# Patient Record
Sex: FEMALE | Race: WHITE | Employment: FULL TIME | URBAN - METROPOLITAN AREA
[De-identification: names, ages, dates, MRNs, and addresses within clinical notes are randomized per-mention and may not be internally consistent; named-entity substitution may affect disease eponyms.]

---

## 2017-04-04 ENCOUNTER — OFFICE VISIT (OUTPATIENT)
Dept: INTERNAL MEDICINE | Facility: CLINIC | Age: 37
End: 2017-04-04

## 2017-04-04 VITALS
DIASTOLIC BLOOD PRESSURE: 73 MMHG | TEMPERATURE: 98.7 F | RESPIRATION RATE: 22 BRPM | SYSTOLIC BLOOD PRESSURE: 112 MMHG | HEART RATE: 80 BPM | WEIGHT: 131 LBS | OXYGEN SATURATION: 96 %

## 2017-04-04 DIAGNOSIS — J98.01 ACUTE BRONCHOSPASM: Primary | ICD-10-CM

## 2017-04-04 DIAGNOSIS — J06.9 VIRAL URI WITH COUGH: ICD-10-CM

## 2017-04-04 RX ORDER — ALBUTEROL SULFATE 90 UG/1
2 AEROSOL, METERED RESPIRATORY (INHALATION) EVERY 6 HOURS
Qty: 1 INHALER | Refills: 1 | Status: SHIPPED | OUTPATIENT
Start: 2017-04-04 | End: 2017-08-04

## 2017-04-04 ASSESSMENT — PAIN SCALES - GENERAL: PAINLEVEL: NO PAIN (0)

## 2017-04-04 NOTE — PROGRESS NOTES
Consuelo Osborne is a 36 year old female who comes in for    CC: wheeze, cough, fever  HPI:  Ms. Osborne reports horrible body aches/cramps and fever 1 week ago, lasting 2 days. Had chills, did not measure temperature.  Now with frequent dry, unproductive cough, is more tired than ususal. Has been wheezing intermittently. L side tender from coughing. Taking ibuprofen, mucinex and Dayquil, which help somewhat. No significant SOB. Hx smoking ~15 yrs total, now smokes a few cigarettes per day.     No hx asthma. +some seasonal allergies. Didn't get a flu shot this year.     Normally seen at [facility] clinic.     Other issues discussed today:     There is no problem list on file for this patient.      Current Outpatient Prescriptions   Medication Sig Dispense Refill     MedroxyPROGESTERone Acetate (DEPO-PROVERA IM) Every 3 months.       IBUPROFEN PO        albuterol (ALBUTEROL) 108 (90 BASE) MCG/ACT Inhaler Inhale 2 puffs into the lungs every 6 hours 1 Inhaler 1         ALLERGIES: Review of patient's allergies indicates no known allergies.    PAST MEDICAL HX:   Past Medical History:   Diagnosis Date     Ectopic pregnancy 2012       PAST SURGICAL HX:   Past Surgical History:   Procedure Laterality Date     ECTOPIC PREGNANCY SURGERY  2012    fallopian tube removed       IMMUNIZATION HX:   There is no immunization history on file for this patient.    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  to Jed.       ROS:   CONSTITUTIONAL:see HPI  EYES: no acute vision problems or changes  ENT: no ear problems, no mouth problems, no throat problems  RESP:see HPI  CV: no chest pain, no palpitations, no new or worsening peripheral edema    OBJECTIVE:  /73 (BP Location: Right arm, Patient Position: Chair, Cuff Size: Adult Regular)   Pulse 80  Temp 98.7  F (37.1  C) (Oral)  Resp 22  Wt 59.4 kg (131 lb)  SpO2 96%  Breastfeeding? No   Wt Readings from Last 1 Encounters:   04/04/17 59.4 kg (131 lb)     Constitutional: no distress, comfortable, pleasant, well-groomed  Eyes: anicteric, conjunctiva pink, normal extra-ocular movements   Ears, Nose and Throat: tympanic membranes pearly gray with positive light reflex, EACs clear bilaterally, nose clear and free of lesions, tonsils 2+ without erythema or exudates, mucosa pink and moist.   Neck: supple with full range of motion, no thyromegaly, no lymphadenopathy  Cardiovascular: regular rate and rhythm, normal S1 and S2, no murmurs, rubs or gallops  Respiratory: clear to auscultation with good air movement bilaterally, expiratory wheezes throughout, no crackles, non-labored, frequent dry cough    ASSESSMENT/PLAN:    1. Acute bronchospasm    2. Viral URI with cough      Discussed viral infection (possibly influenza, though past the point of effectiveness to treat with Tamiflu), which has triggered acute bronchospasm. Recommended albuterol for wheezing, reviewed instructions for use. Can continue with Tylenol and Ibuprofen, Mucinex for symptom control. Push fluids, rest, and good hand hygiene.    FOLLOW UP: If not improving or if worsening, or as needed for any changes or concerns    OSMEL Martinez CNP

## 2017-04-04 NOTE — MR AVS SNAPSHOT
After Visit Summary   4/4/2017    Consuelo Osborne    MRN: [id number]           Patient Information     Date Of Birth          1980        Visit Information        Provider Department      4/4/2017 5:00 PM Rashmi Cruz APRN [facility]        Today's Diagnoses     Acute bronchospasm    -  1    Viral URI with cough          Care Instructions    Primary Care Center Medication Refill Request Information:  * Please contact your pharmacy regarding ANY request for medication refills.  ** [facility] Prescription Fax = [phone]  * Please allow 3 business days for routine medication refills.  * Please allow 5 business days for controlled substance medication refills.     Primary Care Center Test Result notification information:  *You will be notified with in 7-10 days of your appointment day regarding the results of your test.  If you are on MyChart you will be notified as soon as the provider has reviewed the results and signed off on them.    [facility] [phone]           Follow-ups after your visit        Who to contact     Please call your clinic at [phone] to:    Ask questions about your health    Make or cancel appointments    Discuss your medicines    Learn about your test results    Speak to your doctor   If you have compliments or concerns about an experience at your clinic, or if you wish to file a complaint, please contact [facility] Patient Relations at [phone] or email us at [email or web address]         Additional Information About Your Visit        MyChart Information     [employer] is an electronic gateway that provides easy, online access to your medical records. With [employer], you can request a clinic appointment, read your test results, renew a prescription or communicate with your care team.     To sign up for [employer] visit the website at [email or web address]   You will be asked to enter the access  code listed below, as well as some personal information. Please follow the directions to create your username and password.     Your access code is: [email or web address]  Expires: 7/3/2017  5:40 PM     Your access code will  in 90 days. If you need help or a new code, please contact your [facility] or call [phone] for assistance.        Care EveryWhere ID     This is your Care EveryWhere ID. This could be used by other organizations to access your [facility] medical records  [id number]        Your Vitals Were     Pulse Temperature Respirations Pulse Oximetry Breastfeeding?       80 98.7  F (37.1  C) (Oral) 22 96% No        Blood Pressure from Last 3 Encounters:   17 112/73    Weight from Last 3 Encounters:   17 59.4 kg (131 lb)              Today, you had the following     No orders found for display         Today's Medication Changes          These changes are accurate as of: 17  5:40 PM.  If you have any questions, ask your nurse or doctor.               Start taking these medicines.        Dose/Directions    albuterol 108 (90 BASE) MCG/ACT Inhaler   Commonly known as:  albuterol   Used for:  Acute bronchospasm   Started by:  Rashmi Cruz APRN CNP        Dose:  2 puff   Inhale 2 puffs into the lungs every 6 hours   Quantity:  1 Inhaler   Refills:  1            Where to get your medicines      These medications were sent to [address] [address]    Hours:  TRANSPLANT PHONE NUMBER [phone] Phone:  [phone]     albuterol 108 (90 BASE) MCG/ACT Inhaler                Primary Care Provider    None Specified       No primary provider on file.        Thank you!     Thank you for choosing [facility]  for your care. Our goal is always to provide you with excellent care. Hearing back from our patients is one way we can continue to  improve our services. Please take a few minutes to complete the written survey that you may receive in the mail after your visit with us. Thank you!             Your Updated Medication List - Protect others around you: Learn how to safely use, store and throw away your medicines at www.disposemymeds.org.          This list is accurate as of: 4/4/17  5:40 PM.  Always use your most recent med list.                   Brand Name Dispense Instructions for use    albuterol 108 (90 BASE) MCG/ACT Inhaler    albuterol    1 Inhaler    Inhale 2 puffs into the lungs every 6 hours       DEPO-PROVERA IM      Every 3 months.       IBUPROFEN PO

## 2017-04-04 NOTE — PATIENT INSTRUCTIONS
[facility] Medication Refill Request Information:  * Please contact your pharmacy regarding ANY request for medication refills.  ** [facility] Prescription Fax = [phone]  * Please allow 3 business days for routine medication refills.  * Please allow 5 business days for controlled substance medication refills.     [facility] Test Result notification information:  *You will be notified with in 7-10 days of your appointment day regarding the results of your test.  If you are on MyChart you will be notified as soon as the provider has reviewed the results and signed off on them.    [facility] [phone]

## 2017-04-04 NOTE — NURSING NOTE
Chief Complaint   Patient presents with     Cough     Here for fever and body ache 1 week ago for 2 days; gone but having persistent cough, fatigue, and wheezing.      Bhavin Bagley CMA at 4:59 PM on 4/4/2017

## 2017-07-28 ENCOUNTER — ALLIED HEALTH/NURSE VISIT (OUTPATIENT)
Dept: DERMATOLOGY | Facility: CLINIC | Age: 37
End: 2017-07-28

## 2017-07-28 DIAGNOSIS — Z53.9 ERRONEOUS ENCOUNTER--DISREGARD: Primary | ICD-10-CM

## 2017-07-28 DIAGNOSIS — D48.5 NEOPLASM OF UNCERTAIN BEHAVIOR OF SKIN: Primary | ICD-10-CM

## 2017-07-31 LAB — COPATH REPORT: NORMAL

## 2017-08-04 ENCOUNTER — ALLIED HEALTH/NURSE VISIT (OUTPATIENT)
Dept: DERMATOLOGY | Facility: CLINIC | Age: 37
End: 2017-08-04

## 2017-08-04 ENCOUNTER — OFFICE VISIT (OUTPATIENT)
Dept: DERMATOLOGY | Facility: CLINIC | Age: 37
End: 2017-08-04

## 2017-08-04 DIAGNOSIS — L98.8 RHYTIDES: ICD-10-CM

## 2017-08-04 DIAGNOSIS — R23.8 INTRINSIC AGING OF FACIAL SKIN: Primary | ICD-10-CM

## 2017-08-04 DIAGNOSIS — L98.8 WRINKLES: Primary | ICD-10-CM

## 2017-08-04 ASSESSMENT — PAIN SCALES - GENERAL
PAINLEVEL: NO PAIN (0)
PAINLEVEL: NO PAIN (0)

## 2017-08-04 NOTE — PROGRESS NOTES
CC: Ms. Osborne presents to clinic today with concerns of wrinkles and loss of volume.    Botox Injection Procedure Note    ATTENDING STAFF SURGEON: Dr. Lex Eaton    RESIDENT SURGEON: Sultana Jo    NURSE: Vazquez Barcenas (Liza)    OPERATING ROOM DATA:   SURGERY/PROCEDURE DATE:   SAME     ANESTHESIA:   BLT    PREOPERATIVE DIAGNOSIS:   Crow's feet, Furrowing and Rhytides    LOCATION: forehead, glabella, lateral cheeks    LOT NO: [id number]    EXP DATE: June 2019    OPERATION/PROCEDURE:   Intralesional botulinum toxin injection     Dilution with 0.5 mL preserved sterile normal saline in a 50 Unit Botox Vial.    Total units of botulinum toxin: 50    POSTOPERATIVE DIAGNOSIS:   SAME     PREPARATION:   Chloraprep    DESCRIPTION OF OPERATION/PROCEDURE:   The nature and purpose of the procedure, associated risks(including but not limited to muscle weakness, pain, headache, ptosis, anhidrosis), possible consequences and complications, and alternative methods of treatment were explained in detail. The patient declined a personal or family history of neuromuscular disease prior to the procedure.  An informed operative consent was obtained.    Cosmetic procedure: A total of 50 Units were injected into sites at the glabella, lateral to orbit and forehead. The patient tolerated the procedure well and there were no complications noted. 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    Staff Involved:  Resident(Sultana Jo)/Staff(as above)      Soft Tissue Augmentation Procedure Note: Cosmetic    Procedure Date: 8/4/2017    Diagnosis: Facial rhytides and loss of central facial volume    Product: Juvaderma Ultra XC    Attending Staff: Lex Eaton MD    Resident: Catherine Merida    Assistant: Vazquez Barcenas (Liza)    Locations: cheeks and chin      Description of Operation/Procedure:      The nature and purpose of the procedure, associated risks, possible consequences and complications, and alternative methods of treatment were explained in detail including but not limited to bruising, blindness, stroke, ulceration, ischemia, under correction, over correction, swelling, possible need for multiple treatments, infection, granuloma, pain, dyspigmentation, numbness, weakness or tingling were explained to the patient. Discussion of FDA on-label and off-label use was completed and disclosure for any sites treated off-label versus on-label was provided to patient. The patient verbalized understanding. Photo consent and signed informed consent were obtained.Time-out was performed and patient denied history of severe allergy to bees.  The facial areas were cleansed with hibicens and injections were performed. A total of 1cc of filler was used. The patient tolerated the procedure well and there were no complications. Ice was provided post-procedure. The patient was provided after care instructions and will follow-up as needed.     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    Staff:  Resident/Staff    Sultana Jo MD  PGY-4 Dermatology  Pager: [phone]    ATTENDING ATTESTATION    I,Lex Eaton, have seen, evaluated and discussed the patient with resident physician.  I have reviewed the resident physicians note and agree with their clinical findings, assessment and plan.  Any appropriate changes to the resident's note have been made.    I was present for the entire procedure.      Lex Eaton MD, MS    Department of Dermatology  [facility]: Phone: [phone], Fax:[phone]  [facility] [place]: Phone: [phone], Fax: [phone]

## 2017-08-04 NOTE — PATIENT INSTRUCTIONS
Botulinum Toxin(Botox/Dysport) Cosmetic Information      I will have pain, redness, and swelling. I may have bruising, headache or discomfort at the site(s). Risks are asymmetry, numbness, twitching, brow droop, eyelid droop, headache, double vision, not enough effect or too much effect, difficulty whistling or drinking, loss of muscle tone, headache or infection. A touch-up or multiple treatments may be required.      About Botulinum Toxin (Botox/Dysport)  You have inquired about Botox cosmetic. Botulinum toxin is a purified protein derivative developed from bacteria. It has the ability to immobilize facial muscles that create dynamic wrinkles. Dynamic wrinkles develop due to muscle contraction, and over time become permanent folds in the skin, even when the muscles are not flexed. The use of Botox results in a very pleasing cosmetic effect for many people, leading to a more youthful, relaxed appearance. Botox can be used in combination with injectable fillers, chemical peels, and laser resurfacing to treat deeper wrinkles. It also has become an accepted form of treatment for hyperhidrosis, (or excessive sweating) in people who have not responded to other therapies.     With my treatment side effects may include bruising, headache or discomfort at the site(s). Asymmetry may occur and a touch-up may be required. Risks of this procedure include numbness, muscle twitching, brow or eyelid droop, headache, double vision, not enough effect or too much effect, difficulty whistling or drinking from a straw, loss of muscle tone, headache or infection      Post-Procedure Instructions:  Shower, facial cleansing, use of make-up and medicated creams is not restricted. Do not rub the treated area. Avoid exercise for the 24 hours following the procedure. Some people will experience bruising or eyelid ptosis (drooping) after injection. This is temporary and usually mild. Eyelid ptosis may be treated with special eye drops. Call  your doctor if you have any questions or concerns after your treatment.     Who should I call with questions?    [facility]: [phone]     [facility]: [phone]    For urgent needs outside of business hours call the [facility] at [phone] and ask for the resident on call

## 2017-08-04 NOTE — NURSING NOTE
Dermatology Rooming Note    Consuelo Osborne's goals for this visit include:   Chief Complaint   Patient presents with     Botox     Consuelo comes to clinic today for botox and filler.     Soheila Bob, CMA

## 2017-08-04 NOTE — LETTER
8/4/2017       RE: Consuelo Osborne  [address] [place] [address]     Dear Colleague,    Thank you for referring your patient, Consuelo Osborne, to the [facility] at [facility]. Please see a copy of my visit note below.    CC: Ms. Osborne presents to clinic today with concerns of wrinkles and loss of volume.    Botox Injection Procedure Note    ATTENDING STAFF SURGEON: Dr. Lex Eaton    RESIDENT SURGEON: Sultana Jo    NURSE: Vazquez Glynn)    OPERATING ROOM DATA:   SURGERY/PROCEDURE DATE:   SAME     ANESTHESIA:   BLT    PREOPERATIVE DIAGNOSIS:   Crow's feet, Furrowing and Rhytides    LOCATION: forehead, glabella, lateral cheeks    LOT NO: [id number]    EXP DATE: June 2019    OPERATION/PROCEDURE:   Intralesional botulinum toxin injection     Dilution with 0.5 mL preserved sterile normal saline in a 50 Unit Botox Vial.    Total units of botulinum toxin: 50    POSTOPERATIVE DIAGNOSIS:   SAME     PREPARATION:   Chloraprep    DESCRIPTION OF OPERATION/PROCEDURE:   The nature and purpose of the procedure, associated risks(including but not limited to muscle weakness, pain, headache, ptosis, anhidrosis), possible consequences and complications, and alternative methods of treatment were explained in detail. The patient declined a personal or family history of neuromuscular disease prior to the procedure.  An informed operative consent was obtained.    Cosmetic procedure: A total of 50 Units were injected into sites at the glabella, lateral to orbit and forehead. The patient tolerated the procedure well and there were no complications noted. 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    Staff Involved:  Resident(Sultana Jo)/Staff(as above)      Soft Tissue Augmentation Procedure Note: Cosmetic    Procedure Date:  8/4/2017    Diagnosis: Facial rhytides and loss of central facial volume    Product: Juvaderma Ultra XC    Attending Staff: Lex Eaton MD    Resident: Catherine Merida    Assistant: Vazquez Barcenas (Liza)    Locations: cheeks and chin      Description of Operation/Procedure:     The nature and purpose of the procedure, associated risks, possible consequences and complications, and alternative methods of treatment were explained in detail including but not limited to bruising, blindness, stroke, ulceration, ischemia, under correction, over correction, swelling, possible need for multiple treatments, infection, granuloma, pain, dyspigmentation, numbness, weakness or tingling were explained to the patient. Discussion of FDA on-label and off-label use was completed and disclosure for any sites treated off-label versus on-label was provided to patient. The patient verbalized understanding. Photo consent and signed informed consent were obtained.Time-out was performed and patient denied history of severe allergy to bees.  The facial areas were cleansed with hibicens and injections were performed. A total of 1cc of filler was used. The patient tolerated the procedure well and there were no complications. Ice was provided post-procedure. The patient was provided after care instructions and will follow-up as needed.     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    Staff:  Resident/Staff    Sultana Jo MD  PGY-4 Dermatology  Pager: [phone]    ATTENDING ATTESTATION    I,Lex Eaton, have seen, evaluated and discussed the patient with resident physician.  I have reviewed the resident physicians note and agree with their clinical findings, assessment and plan.  Any appropriate changes to the resident's note have been made.    I was present for the entire procedure.      Lex Eaton MD, MS    Department of Dermatology  [facility]  [facility]: Phone: [phone], Fax:[phone]  [facility]: Phone: [phone], Fax: [phone]              Again, thank you for allowing me to participate in the care of your patient.      Sincerely,    Lex Eaton MD

## 2017-08-04 NOTE — PATIENT INSTRUCTIONS
Botulinum Toxin(Botox/Dysport) Cosmetic Information      I will have pain, redness, and swelling. I may have bruising, headache or discomfort at the site(s). Risks are asymmetry, numbness, twitching, brow droop, eyelid droop, headache, double vision, not enough effect or too much effect, difficulty whistling or drinking, loss of muscle tone, headache or infection. A touch-up or multiple treatments may be required.      About Botulinum Toxin (Botox/Dysport)  You have inquired about Botox cosmetic. Botulinum toxin is a purified protein derivative developed from bacteria. It has the ability to immobilize facial muscles that create dynamic wrinkles. Dynamic wrinkles develop due to muscle contraction, and over time become permanent folds in the skin, even when the muscles are not flexed. The use of Botox results in a very pleasing cosmetic effect for many people, leading to a more youthful, relaxed appearance. Botox can be used in combination with injectable fillers, chemical peels, and laser resurfacing to treat deeper wrinkles. It also has become an accepted form of treatment for hyperhidrosis, (or excessive sweating) in people who have not responded to other therapies.     With my treatment side effects may include bruising, headache or discomfort at the site(s). Asymmetry may occur and a touch-up may be required. Risks of this procedure include numbness, muscle twitching, brow or eyelid droop, headache, double vision, not enough effect or too much effect, difficulty whistling or drinking from a straw, loss of muscle tone, headache or infection      Post-Procedure Instructions:  Shower, facial cleansing, use of make-up and medicated creams is not restricted. Do not rub the treated area. Avoid exercise for the 24 hours following the procedure. Some people will experience bruising or eyelid ptosis (drooping) after injection. This is temporary and usually mild. Eyelid ptosis may be treated with special eye drops. Call  your doctor if you have any questions or concerns after your treatment.     Who should I call with questions?    [facility]: [phone]     [facility]: [phone]    For urgent needs outside of business hours call the [facility] at [phone] and ask for the resident on call      Filler Information:    I will have pain, bruising, redness, and swelling after the procedure and lasting for approximately 1-3 weeks. Risks are lumps/bumps, skin discoloration, bleeding, numbness, infection, granuloma formation, scar, ulceration, under correction, over correction and rarely, risks of stroke and blindness. Multiple treatments may be required.        Dermal fillers are injected into the skin to soften crease or folds, support areas of volume loss or contour specific facial areas.     You may experience a mild to moderate amount of stinging or aching sensation post injection.    To ensure an even correction, the physician will massage the area treated, which may cause a temporary amount of redness to your skin.    Bruising at the site of injection is common and may last two weeks    Temporary minimal to moderate swelling can be expected, which should gradually improve following injection    It is normal to experience some tenderness at the treatment site for a few days    After treatment care instructions:    Apply an ice pack or cold compress to the injection area after treatment to help reduce swelling.    Keep your head elevated(even while sleeping) as much as possible and avoid sleeping on your side or stomach    No alcohol consumption or exercise for the first 24 hours after treatment.    Do not touch the treated area for 6 hours    You may use make-up the following day    You may return to normal/routine activities but you should check with your physician for their recommendation     Avoid excessive scrubbing or rubbing of the injection  area    If you have previously suffered from cold sores, there is a risk that the needle punctures around the mouth/lips could contribute to another recurrence    Immediately report to your physician if you have any of the following:    Delayed swelling happening 7-14 days after treatment    Numbness lasting 3-4 days    Muscle weakness in the area of injection    Severe pain    Dusky discoloration of one half of the face    Changes in vision or eye pain    Cold sore    Who should I call with questions?    [facility]: [phone]     [facility]: [phone]    For urgent needs outside of business hours call the [facility] at [phone] and ask for the resident on call

## 2017-08-04 NOTE — MR AVS SNAPSHOT
After Visit Summary   8/4/2017    Consuelo Osborne    MRN: [id number]           Patient Information     Date Of Birth          1980        Visit Information        Provider Department      8/4/2017 2:00 PM Nurse,  Dermatology [facility] Dermatology        Today's Diagnoses     Wrinkles    -  1      Care Instructions    Botulinum Toxin(Botox/Dysport) Cosmetic Information      I will have pain, redness, and swelling. I may have bruising, headache or discomfort at the site(s). Risks are asymmetry, numbness, twitching, brow droop, eyelid droop, headache, double vision, not enough effect or too much effect, difficulty whistling or drinking, loss of muscle tone, headache or infection. A touch-up or multiple treatments may be required.      About Botulinum Toxin (Botox/Dysport)  You have inquired about Botox cosmetic. Botulinum toxin is a purified protein derivative developed from bacteria. It has the ability to immobilize facial muscles that create dynamic wrinkles. Dynamic wrinkles develop due to muscle contraction, and over time become permanent folds in the skin, even when the muscles are not flexed. The use of Botox results in a very pleasing cosmetic effect for many people, leading to a more youthful, relaxed appearance. Botox can be used in combination with injectable fillers, chemical peels, and laser resurfacing to treat deeper wrinkles. It also has become an accepted form of treatment for hyperhidrosis, (or excessive sweating) in people who have not responded to other therapies.     With my treatment side effects may include bruising, headache or discomfort at the site(s). Asymmetry may occur and a touch-up may be required. Risks of this procedure include numbness, muscle twitching, brow or eyelid droop, headache, double vision, not enough effect or too much effect, difficulty whistling or drinking from a straw, loss of muscle tone, headache or infection      Post-Procedure Instructions:  Shower,  facial cleansing, use of make-up and medicated creams is not restricted. Do not rub the treated area. Avoid exercise for the 24 hours following the procedure. Some people will experience bruising or eyelid ptosis (drooping) after injection. This is temporary and usually mild. Eyelid ptosis may be treated with special eye drops. Call your doctor if you have any questions or concerns after your treatment.     Who should I call with questions?    [facility]: [phone]     [facility]: [phone]    For urgent needs outside of business hours call the [facility] at [phone] and ask for the resident on call              Follow-ups after your visit        Who to contact     Please call your clinic at [phone] to:    Ask questions about your health    Make or cancel appointments    Discuss your medicines    Learn about your test results    Speak to your doctor   If you have compliments or concerns about an experience at your clinic, or if you wish to file a complaint, please contact [facility] Phys[facility] Patient Relations at [phone] or email us at [email or web address]         Additional Information About Your Visit        cisimplehart Information     Codingpeoplet is an electronic gateway that provides easy, online access to your medical records. With BookShout!, you can request a clinic appointment, read your test results, renew a prescription or communicate with your care team.     To sign up for Codingpeoplet visit the website at [email or web address]   You will be asked to enter the access code listed below, as well as some personal information. Please follow the directions to create your username and password.     Your access code is: [email or web address]  Expires: 10/26/2017  2:49 PM     Your access code will  in 90 days. If you need help or a new code, please contact your [facility] Clinic or  call [phone] for assistance.        Care EveryWhere ID     This is your Care EveryWhere ID. This could be used by other organizations to access your [place] medical records  [id number]         Blood Pressure from Last 3 Encounters:   04/04/17 112/73    Weight from Last 3 Encounters:   04/04/17 59.4 kg (131 lb)              Today, you had the following     No orders found for display         Today's Medication Changes          These changes are accurate as of: 8/4/17  3:14 PM.  If you have any questions, ask your nurse or doctor.               Stop taking these medicines if you haven't already. Please contact your care team if you have questions.     albuterol 108 (90 BASE) MCG/ACT Inhaler   Commonly known as:  albuterol   Stopped by:  Nurse,  Dermatology           IBUPROFEN PO   Stopped by:  Nurse,  Dermatology                    Primary Care Provider    None Specified       No primary provider on file.        Equal Access to Services     [facility] Center[facility] : Hadjamison Green, miguelina pearson, sharon ortiz . So [facility] [phone].    ATENCIÓN: Si habla español, tiene a pollard disposición servicios gratuitos de asistencia lingüística. Llame al [phone].    We comply with applicable federal civil rights laws and [place] laws. We do not discriminate on the basis of race, color, national origin, age, disability sex, sexual orientation or gender identity.            Thank you!     Thank you for choosing [facility] DERMATOLOGY  for your care. Our goal is always to provide you with excellent care. Hearing back from our patients is one way we can continue to improve our services. Please take a few minutes to complete the written survey that you may receive in the mail after your visit with us. Thank you!             Your Updated Medication List - Protect others around you: Learn how to safely use, store and throw away your medicines at  www.disposemymeds.org.          This list is accurate as of: 8/4/17  3:14 PM.  Always use your most recent med list.                   Brand Name Dispense Instructions for use Diagnosis    DEPO-PROVERA IM      Every 3 months.

## 2017-08-04 NOTE — MR AVS SNAPSHOT
After Visit Summary   8/4/2017    Consuelo Osborne    MRN: [id number]           Patient Information     Date Of Birth          1980        Visit Information        Provider Department      8/4/2017 2:45 PM Lex Eaton MD [facility] Dermatology        Care Instructions    Botulinum Toxin(Botox/Dysport) Cosmetic Information      I will have pain, redness, and swelling. I may have bruising, headache or discomfort at the site(s). Risks are asymmetry, numbness, twitching, brow droop, eyelid droop, headache, double vision, not enough effect or too much effect, difficulty whistling or drinking, loss of muscle tone, headache or infection. A touch-up or multiple treatments may be required.      About Botulinum Toxin (Botox/Dysport)  You have inquired about Botox cosmetic. Botulinum toxin is a purified protein derivative developed from bacteria. It has the ability to immobilize facial muscles that create dynamic wrinkles. Dynamic wrinkles develop due to muscle contraction, and over time become permanent folds in the skin, even when the muscles are not flexed. The use of Botox results in a very pleasing cosmetic effect for many people, leading to a more youthful, relaxed appearance. Botox can be used in combination with injectable fillers, chemical peels, and laser resurfacing to treat deeper wrinkles. It also has become an accepted form of treatment for hyperhidrosis, (or excessive sweating) in people who have not responded to other therapies.     With my treatment side effects may include bruising, headache or discomfort at the site(s). Asymmetry may occur and a touch-up may be required. Risks of this procedure include numbness, muscle twitching, brow or eyelid droop, headache, double vision, not enough effect or too much effect, difficulty whistling or drinking from a straw, loss of muscle tone, headache or infection      Post-Procedure Instructions:  Shower, facial cleansing, use of make-up and  medicated creams is not restricted. Do not rub the treated area. Avoid exercise for the 24 hours following the procedure. Some people will experience bruising or eyelid ptosis (drooping) after injection. This is temporary and usually mild. Eyelid ptosis may be treated with special eye drops. Call your doctor if you have any questions or concerns after your treatment.     Who should I call with questions?    [facility]: [phone]     [facility]: [phone]    For urgent needs outside of business hours call the [facility] at [phone] and ask for the resident on call      Filler Information:    I will have pain, bruising, redness, and swelling after the procedure and lasting for approximately 1-3 weeks. Risks are lumps/bumps, skin discoloration, bleeding, numbness, infection, granuloma formation, scar, ulceration, under correction, over correction and rarely, risks of stroke and blindness. Multiple treatments may be required.        Dermal fillers are injected into the skin to soften crease or folds, support areas of volume loss or contour specific facial areas.     You may experience a mild to moderate amount of stinging or aching sensation post injection.    To ensure an even correction, the physician will massage the area treated, which may cause a temporary amount of redness to your skin.    Bruising at the site of injection is common and may last two weeks    Temporary minimal to moderate swelling can be expected, which should gradually improve following injection    It is normal to experience some tenderness at the treatment site for a few days    After treatment care instructions:    Apply an ice pack or cold compress to the injection area after treatment to help reduce swelling.    Keep your head elevated(even while sleeping) as much as possible and avoid sleeping on your side or stomach    No alcohol consumption or exercise for the  first 24 hours after treatment.    Do not touch the treated area for 6 hours    You may use make-up the following day    You may return to normal/routine activities but you should check with your physician for their recommendation     Avoid excessive scrubbing or rubbing of the injection area    If you have previously suffered from cold sores, there is a risk that the needle punctures around the mouth/lips could contribute to another recurrence    Immediately report to your physician if you have any of the following:    Delayed swelling happening 7-14 days after treatment    Numbness lasting 3-4 days    Muscle weakness in the area of injection    Severe pain    Dusky discoloration of one half of the face    Changes in vision or eye pain    Cold sore    Who should I call with questions?    [facility]: [phone]     [facility]: [phone]    For urgent needs outside of business hours call the [facility] at [phone] and ask for the resident on call              Follow-ups after your visit        Who to contact     Please call your clinic at [phone] to:    Ask questions about your health    Make or cancel appointments    Discuss your medicines    Learn about your test results    Speak to your doctor   If you have compliments or concerns about an experience at your clinic, or if you wish to file a complaint, please contact [facility] Physicians Patient Relations at [phone] or email us at [email or web address]         Additional Information About Your Visit        Insync Systemshart Information     Twillion is an electronic gateway that provides easy, online access to your medical records. With Twillion, you can request a clinic appointment, read your test results, renew a prescription or communicate with your care team.     To sign up for PollVaultrt visit the website at [email or web address]   You will be asked to  enter the access code listed below, as well as some personal information. Please follow the directions to create your username and password.     Your access code is: [email or web address]  Expires: 10/26/2017  2:49 PM     Your access code will  in 90 days. If you need help or a new code, please contact your [facility] or call [phone] for assistance.        Care EveryWhere ID     This is your Care EveryWhere ID. This could be used by other organizations to access your [place] medical records  [id number]         Blood Pressure from Last 3 Encounters:   17 112/73    Weight from Last 3 Encounters:   17 59.4 kg (131 lb)              Today, you had the following     No orders found for display         Today's Medication Changes          These changes are accurate as of: 17  6:16 PM.  If you have any questions, ask your nurse or doctor.               Stop taking these medicines if you haven't already. Please contact your care team if you have questions.     albuterol 108 (90 BASE) MCG/ACT Inhaler   Commonly known as:  albuterol   Stopped by:  Nurse,  Dermatology           IBUPROFEN PO   Stopped by:  Nurse,  Dermatology                    Primary Care Provider    None Specified       No primary provider on file.        Equal Access to Services     JANNETH LINARES : Lizbet Green, miguelina pearson, ammy cade, sharon rooney. So [facility] [phone].    ATENCIÓN: Si habla español, tiene a pollard disposición servicios gratuitos de asistencia lingüística. Llame al [phone].    We comply with applicable federal civil rights laws and [place] laws. We do not discriminate on the basis of race, color, national origin, age, disability sex, sexual orientation or gender identity.            Thank you!     Thank you for choosing [facility] DERMATOLOGY  for your care. Our goal is always to provide you with excellent care. Hearing  back from our patients is one way we can continue to improve our services. Please take a few minutes to complete the written survey that you may receive in the mail after your visit with us. Thank you!             Your Updated Medication List - Protect others around you: Learn how to safely use, store and throw away your medicines at www.disposemymeds.org.          This list is accurate as of: 8/4/17  6:16 PM.  Always use your most recent med list.                   Brand Name Dispense Instructions for use Diagnosis    DEPO-PROVERA IM      Every 3 months.

## 2017-08-04 NOTE — LETTER
Date:August 7, 2017      Patient was self referred, no letter generated. Do not send.        [facility] Health Information

## 2017-08-04 NOTE — NURSING NOTE
Dermatology Rooming Note    Consuelo Osborne's goals for this visit include:   Chief Complaint   Patient presents with     Botox     Consuelo comes to clinic today for botox and filler.     \Soheila Bob, CMA

## 2017-12-28 ENCOUNTER — OFFICE VISIT (OUTPATIENT)
Dept: INTERNAL MEDICINE | Facility: CLINIC | Age: 37
End: 2017-12-28
Payer: COMMERCIAL

## 2017-12-28 VITALS
HEART RATE: 78 BPM | OXYGEN SATURATION: 98 % | SYSTOLIC BLOOD PRESSURE: 117 MMHG | DIASTOLIC BLOOD PRESSURE: 72 MMHG | WEIGHT: 134.8 LBS

## 2017-12-28 DIAGNOSIS — R55 SYNCOPE, UNSPECIFIED SYNCOPE TYPE: ICD-10-CM

## 2017-12-28 DIAGNOSIS — R55 SYNCOPE, UNSPECIFIED SYNCOPE TYPE: Primary | ICD-10-CM

## 2017-12-28 DIAGNOSIS — R07.89 ATYPICAL CHEST PAIN: ICD-10-CM

## 2017-12-28 DIAGNOSIS — R51.9 INTRACTABLE HEADACHE, UNSPECIFIED CHRONICITY PATTERN, UNSPECIFIED HEADACHE TYPE: ICD-10-CM

## 2017-12-28 LAB
ALBUMIN SERPL-MCNC: 4.1 G/DL (ref 3.4–5)
ALP SERPL-CCNC: 48 U/L (ref 40–150)
ALT SERPL W P-5'-P-CCNC: 19 U/L (ref 0–50)
ANION GAP SERPL CALCULATED.3IONS-SCNC: 6 MMOL/L (ref 3–14)
AST SERPL W P-5'-P-CCNC: 16 U/L (ref 0–45)
BASOPHILS # BLD AUTO: 0.1 10E9/L (ref 0–0.2)
BASOPHILS NFR BLD AUTO: 0.7 %
BILIRUB SERPL-MCNC: 0.3 MG/DL (ref 0.2–1.3)
BUN SERPL-MCNC: 13 MG/DL (ref 7–30)
CALCIUM SERPL-MCNC: 9.1 MG/DL (ref 8.5–10.1)
CHLORIDE SERPL-SCNC: 108 MMOL/L (ref 94–109)
CO2 SERPL-SCNC: 27 MMOL/L (ref 20–32)
CREAT SERPL-MCNC: 0.71 MG/DL (ref 0.52–1.04)
DIFFERENTIAL METHOD BLD: NORMAL
EOSINOPHIL # BLD AUTO: 0 10E9/L (ref 0–0.7)
EOSINOPHIL NFR BLD AUTO: 0 %
ERYTHROCYTE [DISTWIDTH] IN BLOOD BY AUTOMATED COUNT: 12.3 % (ref 10–15)
GFR SERPL CREATININE-BSD FRML MDRD: >90 ML/MIN/1.7M2
GLUCOSE SERPL-MCNC: 96 MG/DL (ref 70–99)
HCG SERPL QL: NEGATIVE
HCT VFR BLD AUTO: 41.7 % (ref 35–47)
HGB BLD-MCNC: 13.5 G/DL (ref 11.7–15.7)
IMM GRANULOCYTES # BLD: 0 10E9/L (ref 0–0.4)
IMM GRANULOCYTES NFR BLD: 0.1 %
INTERPRETATION ECG - MUSE: NORMAL
LYMPHOCYTES # BLD AUTO: 2.5 10E9/L (ref 0.8–5.3)
LYMPHOCYTES NFR BLD AUTO: 32.9 %
MCH RBC QN AUTO: 30.6 PG (ref 26.5–33)
MCHC RBC AUTO-ENTMCNC: 32.4 G/DL (ref 31.5–36.5)
MCV RBC AUTO: 95 FL (ref 78–100)
MONOCYTES # BLD AUTO: 0.6 10E9/L (ref 0–1.3)
MONOCYTES NFR BLD AUTO: 7.6 %
NEUTROPHILS # BLD AUTO: 4.4 10E9/L (ref 1.6–8.3)
NEUTROPHILS NFR BLD AUTO: 58.7 %
NRBC # BLD AUTO: 0 10*3/UL
NRBC BLD AUTO-RTO: 0 /100
PLATELET # BLD AUTO: 165 10E9/L (ref 150–450)
POTASSIUM SERPL-SCNC: 4.1 MMOL/L (ref 3.4–5.3)
PROT SERPL-MCNC: 7.4 G/DL (ref 6.8–8.8)
RBC # BLD AUTO: 4.41 10E12/L (ref 3.8–5.2)
SODIUM SERPL-SCNC: 142 MMOL/L (ref 133–144)
TSH SERPL DL<=0.005 MIU/L-ACNC: 0.82 MU/L (ref 0.4–4)
WBC # BLD AUTO: 7.5 10E9/L (ref 4–11)

## 2017-12-28 ASSESSMENT — PAIN SCALES - GENERAL: PAINLEVEL: NO PAIN (0)

## 2017-12-28 NOTE — MR AVS SNAPSHOT
After Visit Summary   12/28/2017    Consuelo Osborne    MRN: [id number]           Patient Information     Date Of Birth          1980        Visit Information        Provider Department      12/28/2017 7:00 AM Rashmi Cruz APRN [facility]        Today's Diagnoses     Syncope, unspecified syncope type    -  1    Atypical chest pain          Care Instructions    Primary Care Center: [phone]     Primary Care Center Medication Refill Request Information:  * Please contact your pharmacy regarding ANY request for medication refills.  ** [facility] Prescription Fax = [phone]  * Please allow 3 business days for routine medication refills.  * Please allow 5 business days for controlled substance medication refills.     Primary Care Center Test Result notification information:  *You will be notified with in 7-10 days of your appointment day regarding the results of your test.  If you are on MyChart you will be notified as soon as the provider has reviewed the results and signed off on them.      Understanding Vasovagal Syncope  Vasovagal syncope is fainting caused by a complex nerve and blood vessel reaction in the body. It s the most common cause of fainting. Unlike other causes of fainting, it s not a sign of a problem with the heart or brain.     How to say it  OFC-rh-OXH-maxine  SINK-o-pee   How vasovagal syncope happens  Many nerves connect with your heart and blood vessels. These nerves help control the speed and force of your heartbeat. They also regulate blood pressure. They control whether your blood vessels should be more open or more closed.  Usually these nerves work together so you always get enough blood to your brain. In certain cases, these nerves may give a wrong signal. This may cause your blood vessels to open wide. At the same time, your heartbeat slows down. Blood can start to pool in your legs, and not enough of it may reach the brain. If that happens, you  may briefly lose consciousness. When you lie down or fall down, blood flow to the brain resumes.  What causes vasovagal syncope?  Many triggers can cause vasovagal syncope, such as:    Standing for long periods    Too much heat    Intense emotion, such as fear    Intense pain    The sight of blood or a needle    Exercising for a long time  Older adults may have additional triggers, such as:    Urinating    Swallowing    Coughing    Having a bowel movement  Symptoms of vasovagal syncope  Fainting is the main symptom of vasovagal syncope. You may have symptoms before fainting such as:    Nausea    Warm, flushed feeling    Face that turns pale    Sweaty palms    Feeling dizzy    Blurred vision  If you lie down at the first sign of these symptoms, you will often be able to prevent fainting. Not everyone notices symptoms before fainting, however.  When a person does faint, lying down restores blood flow to the brain. Consciousness should return fairly quickly. You might not feel normal for a little while after you faint. You might feel depressed or fatigued for a short time. You may even feel nauseous afterwards and vomit.  Some people have only 1 or 2 episodes of vasovagal syncope in their life. For others, it happens more often and with no warning.  Diagnosing vasovagal syncope  Your healthcare provider will ask about your health history and your symptoms. He or she will give you a physical exam. Your blood pressure may be measured while lying down, seated, and standing. You may also have an electrocardiogram (ECG). This is a simple test that looks at the heart s rhythm.  You may be checked for other possible causes of fainting. You may have other tests such as:    Continuous portable ECG monitoring, to look at heart rhythms over time, such as with a holter or event monitor    Echocardiogram, to look at the blood flow in the heart and the heart s motion    Exercise stress testing, to see how your heart does during  exercise    Blood tests to check for signs of disease  If these tests are normal, you may have a tilt table test. For this test, you lie down on a platform. Your heart rate and blood pressure are measured while you are lying down. The platform is then tilted upright. Your heart rate and blood pressure are measured again. If you have vasovagal syncope, you may faint during the upward tilt. Sometimes medicines that increase heart rate and the force of heart contractions are used to try and provoke a syncopal episode.  There are many causes of syncope. Some causes are not dangerous. In older persons, unexplained syncope can be a sign of a serious infection or a heart attack. Call 911 or seek immediate medical attention to be evaluated, especially if there has been a fall and injury with the syncope. You should not drive yourself to the hospital or emergency department after a syncopal episode for the safety of yourself or other drivers and passengers. Have someone drive you. Your provider may restrict your driving until the cause of the syncope is better understood and to make sure the syncope does not become chronic or if it is unpredictable.  Date Last Reviewed: 3/1/2017    3264-4914 [employer]. [address] [place], [place] [address]. All rights reserved. This information is not intended as a substitute for professional medical care. Always follow your healthcare professional's instructions.        SPECIAL THERAPY MEASURES FOR INSOMNIA  Relaxation Exercises:  Progressive Muscle Relaxation (PMR) and Deep Breathing Exercises  Stress, tension, and anxiety can be big factors contributing to insomnia.  If you can t relax your mind and body, then you won t be able to sleep.  You would likely benefit from trying some of the relaxation exercises that we ve assembled below.  These are all internet based links.  If you don t have or use a computer, you may benefit from one of the stress management books  listed below that you can get at your [place] library or at a local [employer] for a relatively low price.    You may have to pay for some of these resources.    http://www.[employer]/progressive-muscle-relaxation-exercise.html     http://studentsupport.[facility]/counseling/resources/self-help/relaxation-and-stress-management/     http://www.[employer]/breathing-awareness.html   Meditation and Guided Imagery    wwwBraveNewTalent    wwwDelaware Valley Industrial Resource Center (DVIRC)guidedCreemeditationCreesite.CreditPing.com    http://wwwAmorcyte/guided-imagery-scripts.html    http://CleanApp/[employer]/ccgyhlsmfq-itlmpa-rqifmgg/  Sleep Restriction Therapy:  Limit time in bed for 15 minutes more than sleep   Do not set limit under 4 hours   Increase time by 15 minutes per effective sleep trial   Effective sleep suggests >90% of bedtime asleep   Stimulus Control:  Do not lie awake for more than 30 minutes   Get out of bed and perform a quiet activity   Return to bed when sleepy   Repeat as many times per night as needed   No Napping during day   Suggested Resources:   Insomnia Treatment Books     Overcoming Insomnia by Ramirez Domínguez and Marilee Ellis (2008)    No More Sleepless Nights by Norris Marlow and Cheri Mcdermott (1996)    Say Faheem to Insomnia by Ramon Douglass (2009)    The Insomnia Workbook by Lelia Betancur and Atlon Salazar (2009)    The Insomnia Answer by Lex Carpio and Ruben Choudhury (2006)    Therapy for Sleep Problems                                                                                   Cognitive behavioral treatment for insomnia (CBT-I) is a very effective technique that involves changing behaviors and thoughts associated with sleep.             Follow-ups after your visit        Follow-up notes from your care team     Return in about 4 weeks (around 1/25/2018).      Your next 10 appointments already scheduled     Dec 28, 2017  8:00 AM [facility]   LAB with [facility] ([place]  [facility])    [address] [address] [phone]           Please do not eat 10-12 hours before your appointment if you are coming in fasting for labs on lipids, cholesterol, or glucose (sugar). This does not apply to pregnant women. Water, hot tea and black coffee (with nothing added) are okay. Do not drink other fluids, diet soda or chew gum.              Future tests that were ordered for you today     Open Future Orders        Priority Expected Expires Ordered    TSH with free T4 reflex Routine 2017    Echocardiogram Routine  2018    CBC with platelets differential Routine 2017    Comprehensive metabolic panel Routine 2017            Who to contact     Please call your clinic at [phone] to:    Ask questions about your health    Make or cancel appointments    Discuss your medicines    Learn about your test results    Speak to your doctor   If you have compliments or concerns about an experience at your clinic, or if you wish to file a complaint, please contact [facility] Patient Relations at [phone] or email us at [email or web address]         Additional Information About Your Visit        [employer] Information     [employer]ion is an electronic gateway that provides easy, online access to your medical records. With [employer], you can request a clinic appointment, read your test results, renew a prescription or communicate with your care team.     To sign up for LabMindst visit the website at [email or web address]   You will be asked to enter the access code listed below, as well as some personal information. Please follow the directions to create your username and password.     Your access code is: [email or web address]  Expires: 3/28/2018  6:31 AM     Your access code will  in 90 days. If you need help or a new  code, please contact your [facility] or call [phone] for assistance.        Care EveryWhere ID     This is your Care EveryWhere ID. This could be used by other organizations to access your [place] medical records  [id number]        Your Vitals Were     Pulse Pulse Oximetry Breastfeeding?             78 98% No          Blood Pressure from Last 3 Encounters:   12/28/17 117/72   04/04/17 112/73    Weight from Last 3 Encounters:   12/28/17 61.1 kg (134 lb 12.8 oz)   04/04/17 59.4 kg (131 lb)              We Performed the Following     EKG 12-LEAD CLINIC READ     HCG qualitative        Primary Care Provider    None Specified       No primary provider on file.        Equal Access to Services     JANNETH LINARES : Lizbet Green, miguelina pearson, ammy cade, sharon rooney. [facility] [phone].    ATENCIÓN: Si habla español, tiene a pollard disposición servicios gratuitos de asistencia lingüística. Llame al [phone].    We comply with applicable federal civil rights laws and [place] laws. We do not discriminate on the basis of race, color, national origin, age, disability, sex, sexual orientation, or gender identity.            Thank you!     Thank you for choosing [facility]  for your care. Our goal is always to provide you with excellent care. Hearing back from our patients is one way we can continue to improve our services. Please take a few minutes to complete the written survey that you may receive in the mail after your visit with us. Thank you!             Your Updated Medication List - Protect others around you: Learn how to safely use, store and throw away your medicines at www.disposemymeds.org.          This list is accurate as of: 12/28/17  7:50 AM.  Always use your most recent med list.                   Brand Name Dispense Instructions for use Diagnosis    DEPO-PROVERA IM      Every 3 months.

## 2017-12-28 NOTE — NURSING NOTE
EKG preformed results to provider patient tolerated well.     Angie Hinojosa LPN at 8:07 AM on 12/28/2017.

## 2017-12-28 NOTE — PATIENT INSTRUCTIONS
[facility]: [phone]     [facility] Medication Refill Request Information:  * Please contact your pharmacy regarding ANY request for medication refills.  ** [facility] Prescription Fax = [phone]  * Please allow 3 business days for routine medication refills.  * Please allow 5 business days for controlled substance medication refills.     [facility] Test Result notification information:  *You will be notified with in 7-10 days of your appointment day regarding the results of your test.  If you are on MyChart you will be notified as soon as the provider has reviewed the results and signed off on them.      Understanding Vasovagal Syncope  Vasovagal syncope is fainting caused by a complex nerve and blood vessel reaction in the body. It s the most common cause of fainting. Unlike other causes of fainting, it s not a sign of a problem with the heart or brain.     How to say it  VYC-qf-SLJ-maxine  SINK-o-pee   How vasovagal syncope happens  Many nerves connect with your heart and blood vessels. These nerves help control the speed and force of your heartbeat. They also regulate blood pressure. They control whether your blood vessels should be more open or more closed.  Usually these nerves work together so you always get enough blood to your brain. In certain cases, these nerves may give a wrong signal. This may cause your blood vessels to open wide. At the same time, your heartbeat slows down. Blood can start to pool in your legs, and not enough of it may reach the brain. If that happens, you may briefly lose consciousness. When you lie down or fall down, blood flow to the brain resumes.  What causes vasovagal syncope?  Many triggers can cause vasovagal syncope, such as:    Standing for long periods    Too much heat    Intense emotion, such as fear    Intense pain    The sight of blood or a needle    Exercising for a long time  Older adults may have additional triggers, such  as:    Urinating    Swallowing    Coughing    Having a bowel movement  Symptoms of vasovagal syncope  Fainting is the main symptom of vasovagal syncope. You may have symptoms before fainting such as:    Nausea    Warm, flushed feeling    Face that turns pale    Sweaty palms    Feeling dizzy    Blurred vision  If you lie down at the first sign of these symptoms, you will often be able to prevent fainting. Not everyone notices symptoms before fainting, however.  When a person does faint, lying down restores blood flow to the brain. Consciousness should return fairly quickly. You might not feel normal for a little while after you faint. You might feel depressed or fatigued for a short time. You may even feel nauseous afterwards and vomit.  Some people have only 1 or 2 episodes of vasovagal syncope in their life. For others, it happens more often and with no warning.  Diagnosing vasovagal syncope  Your healthcare provider will ask about your health history and your symptoms. He or she will give you a physical exam. Your blood pressure may be measured while lying down, seated, and standing. You may also have an electrocardiogram (ECG). This is a simple test that looks at the heart s rhythm.  You may be checked for other possible causes of fainting. You may have other tests such as:    Continuous portable ECG monitoring, to look at heart rhythms over time, such as with a holter or event monitor    Echocardiogram, to look at the blood flow in the heart and the heart s motion    Exercise stress testing, to see how your heart does during exercise    Blood tests to check for signs of disease  If these tests are normal, you may have a tilt table test. For this test, you lie down on a platform. Your heart rate and blood pressure are measured while you are lying down. The platform is then tilted upright. Your heart rate and blood pressure are measured again. If you have vasovagal syncope, you may faint during the upward tilt.  Sometimes medicines that increase heart rate and the force of heart contractions are used to try and provoke a syncopal episode.  There are many causes of syncope. Some causes are not dangerous. In older persons, unexplained syncope can be a sign of a serious infection or a heart attack. Call 911 or seek immediate medical attention to be evaluated, especially if there has been a fall and injury with the syncope. You should not drive yourself to the hospital or emergency department after a syncopal episode for the safety of yourself or other drivers and passengers. Have someone drive you. Your provider may restrict your driving until the cause of the syncope is better understood and to make sure the syncope does not become chronic or if it is unpredictable.  Date Last Reviewed: 3/1/2017    0956-7815 [employer]. [address] [place] [address]. All rights reserved. This information is not intended as a substitute for professional medical care. Always follow your healthcare professional's instructions.        SPECIAL THERAPY MEASURES FOR INSOMNIA  Relaxation Exercises:  Progressive Muscle Relaxation (PMR) and Deep Breathing Exercises  Stress, tension, and anxiety can be big factors contributing to insomnia.  If you can t relax your mind and body, then you won t be able to sleep.  You would likely benefit from trying some of the relaxation exercises that we ve assembled below.  These are all internet based links.  If you don t have or use a computer, you may benefit from one of the stress management books listed below that you can get at your public library or at a local bookstore for a relatively low price.    You may have to pay for some of these resources.    http://www.Weekend-a-gogo.com/progressive-muscle-relaxation-exercise.html     http://studentsupport.[facility]/counseling/resources/self-help/relaxation-and-stress-management/      http://www.[place].[employer]/breathing-awareness.html   Meditation and Guided Imagery    www.Kontest    www.the-guided-meditation-site.com    http://www.[employer]/guided-imagery-scripts.html    http://Gifi/library/siudrxqwxf-czkiyh-wwpvzei/  Sleep Restriction Therapy:  Limit time in bed for 15 minutes more than sleep   Do not set limit under 4 hours   Increase time by 15 minutes per effective sleep trial   Effective sleep suggests >90% of bedtime asleep   Stimulus Control:  Do not lie awake for more than 30 minutes   Get out of bed and perform a quiet activity   Return to bed when sleepy   Repeat as many times per night as needed   No Napping during day   Suggested Resources:   Insomnia Treatment Books     Overcoming Insomnia by Ramirez Domínguez and Marilee Ellis (2008)    No More Sleepless Nights by Norris Marlow and Cheri Mcdermott (1996)    Say Faheem to Insomnia by Ramon Douglass (2009)    The Insomnia Workbook by Lelia Betancur and Alton Salazar (2009)    The Insomnia Answer by Lex Carpio and Ruben Choudhury (2006)    Therapy for Sleep Problems                                                                                   Cognitive behavioral treatment for insomnia (CBT-I) is a very effective technique that involves changing behaviors and thoughts associated with sleep.

## 2017-12-28 NOTE — NURSING NOTE
Chief Complaint   Patient presents with     Dizziness     Patient is here to discuss dizziness.     Angie Hinojosa LPN at 7:09 AM on 12/28/2017.

## 2017-12-28 NOTE — PROGRESS NOTES
"Consuelo Osborne is a 37 year old female who comes in for    CC: passed out at dentist  HPI:    Ms. Osborne was at her dentist office yesterday and tried to sit up, had a mild headache and felt very hot and sweaty. She passed out during her visit, was difficult to arouse, and felt she couldn't get off the floor. Denies palpitations prior to fainting. She does not know how long she was \"out\". Paramedics were called, but she was already coming to by the time they arrived. Her VS were normal. Her  had to pick her up. Denies chance of pregnancy, is regular with Depo-Provera injections.     She also reports L-sided headaches, fingers tingling and \"eye twitching\" on and off over the past 6 months. Nothing seems to make worse or better. No known triggers. Headaches occur a few times per week; it is unclear how long they last. She has tried taking Ibuprofen, but this doesn't help the pain. Pain is rated 10/10 when it occurs. Denies phono/photophobia, no N/V, just feels \"not there\" for a minute. Headaches do not wake her from sleep. Denies neck pain.    Has had issues with sleep for the past year, usually sleeping 6-7 hours per night, frequently waking at 5 am and unable to fall back asleep, never feeling fully rested.    Also has intermittent L arm pain/numbness and chest tightness. No palpitations. A chest Xray and chest CT were done through her PCP at [facility] and were clear, other than \"lungs filled with air.\" Was told to try Ranitidine, but this did not help her symptoms. She denies CP or SOB with exercise. No cough or wheeze.    Other issues discussed today:     Patient Active Problem List   Diagnosis     Neoplasm of uncertain behavior of skin       Current Outpatient Prescriptions   Medication Sig Dispense Refill     MedroxyPROGESTERone Acetate (DEPO-PROVERA IM) Every 3 months.           ALLERGIES: Review of patient's allergies indicates no known allergies.    PAST MEDICAL HX:   Past Medical History: "   Diagnosis Date     Ectopic pregnancy 2012       PAST SURGICAL HX:   Past Surgical History:   Procedure Laterality Date     ECTOPIC PREGNANCY SURGERY  2012    fallopian tube removed       IMMUNIZATION HX:   There is no immunization history on file for this patient.    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  to Jed.       ROS:   CONSTITUTIONAL: no fatigue, no unexpected change in weight  SKIN: no worrisome rashes, no worrisome moles, no worrisome lesions  EYES: no acute vision problems or changes  ENT: no ear problems, no mouth problems, no throat problems  RESP: no significant cough, no shortness of breath  CV: see HPI, no new or worsening peripheral edema  NEURO: see HPI    OBJECTIVE:  /72  Pulse 78  Wt 61.1 kg (134 lb 12.8 oz)  SpO2 98%  Breastfeeding? No   Wt Readings from Last 1 Encounters:   12/28/17 61.1 kg (134 lb 12.8 oz)     Constitutional: no distress, comfortable, pleasant, well-groomed  Eyes: anicteric, conjunctiva pink, normal extra-ocular movements   Ears, Nose and Throat: L TM pearly gray with positive light reflex, R EAC occluded with cerumen, nose clear and free of lesions, throat clear, mucosa pink and moist.   Neck: supple with full range of motion, no thyromegaly, no lymphadenopathy  Cardiovascular: regular rate and rhythm, normal S1 and S2, 2/6 systolic murmur at LUSB, no rubs or gallops, peripheral pulses full and symmetric, cap refill <2 sec  Respiratory: clear to auscultation with good air movement bilaterally, no wheezes or crackles, non-labored  Gastrointestinal: positive bowel sounds, soft, non-distended, nontender, no hepatosplenomegaly, no masses   Skin: no concerning lesions or rash, no jaundice, temp normal   Neurological: cranial nerves intact, normal strength and sensation, 2+ patellar reflexes, gait is steady with intact balance, speech is clear, no tremor   Psychological: appropriate mood,  demonstrates intact judgment and logical thought process      ASSESSMENT/PLAN:    1. Syncope, unspecified syncope type  2. Atypical chest pain  3. Intractable headache, unspecified chronicity pattern, unspecified headache type  EKG shows normal sinus rhythm with no acute changes. Will obtain echo given soft murmur and recent syncopal episode, however, this is most likely related to vasovagal response. Will check labs today. No red flags on exam.  Reviewed sleep hygiene and measures for insomnia. Recommended keeping headache diary to track headaches and associated symptoms, triggers, and timing factors. May benefit from headache prophylaxis such as Amitriptyline 10 mg qHS.   - CBC with platelets differential; Future  - Comprehensive metabolic panel; Future  - EKG 12-LEAD CLINIC READ  - Echocardiogram; Future  - TSH with free T4 reflex; Future  - HCG qualitative    FOLLOW UP: Return in about 4 weeks (around 1/25/2018), or sooner if symptoms worsen.  OSMEL Martinez CNP

## 2017-12-29 ENCOUNTER — RADIANT APPOINTMENT (OUTPATIENT)
Dept: CARDIOLOGY | Facility: CLINIC | Age: 37
End: 2017-12-29
Attending: NURSE PRACTITIONER
Payer: COMMERCIAL

## 2017-12-29 DIAGNOSIS — R55 SYNCOPE, UNSPECIFIED SYNCOPE TYPE: ICD-10-CM

## 2018-04-05 ENCOUNTER — OFFICE VISIT (OUTPATIENT)
Dept: DERMATOLOGY | Facility: CLINIC | Age: 38
End: 2018-04-05
Payer: COMMERCIAL

## 2018-04-05 DIAGNOSIS — H02.409 AGE-RELATED RHYTIDES AND PTOSIS: Primary | ICD-10-CM

## 2018-04-05 DIAGNOSIS — L98.8 AGE-RELATED RHYTIDES AND PTOSIS: Primary | ICD-10-CM

## 2018-04-05 ASSESSMENT — PAIN SCALES - GENERAL: PAINLEVEL: NO PAIN (1)

## 2018-04-05 NOTE — MR AVS SNAPSHOT
After Visit Summary   4/5/2018    Consuelo Osborne    MRN: [id number]           Patient Information     Date Of Birth          1980        Visit Information        Provider Department      4/5/2018 9:15 AM Hunter Campos MD [facility] Dermatology        Today's Diagnoses     Age-related rhytides and ptosis    -  1      Care Instructions    Botulinum Toxin(Botox/Dysport) Cosmetic Information      I will have pain, redness, and swelling. I may have bruising, headache or discomfort at the site(s). Risks are asymmetry, numbness, twitching, brow droop, eyelid droop, headache, double vision, not enough effect or too much effect, difficulty whistling or drinking, loss of muscle tone, headache or infection. A touch-up or multiple treatments may be required.      About Botulinum Toxin (Botox/Dysport)  You have inquired about Botox cosmetic. Botulinum toxin is a purified protein derivative developed from bacteria. It has the ability to immobilize facial muscles that create dynamic wrinkles. Dynamic wrinkles develop due to muscle contraction, and over time become permanent folds in the skin, even when the muscles are not flexed. The use of Botox results in a very pleasing cosmetic effect for many people, leading to a more youthful, relaxed appearance. Botox can be used in combination with injectable fillers, chemical peels, and laser resurfacing to treat deeper wrinkles. It also has become an accepted form of treatment for hyperhidrosis, (or excessive sweating) in people who have not responded to other therapies.     With my treatment side effects may include bruising, headache or discomfort at the site(s). Asymmetry may occur and a touch-up may be required. Risks of this procedure include numbness, muscle twitching, brow or eyelid droop, headache, double vision, not enough effect or too much effect, difficulty whistling or drinking from a straw, loss of muscle tone, headache or  infection      Post-Procedure Instructions:  Shower, facial cleansing, use of make-up and medicated creams is not restricted. Do not rub the treated area. Avoid exercise for the 24 hours following the procedure. Some people will experience bruising or eyelid ptosis (drooping) after injection. This is temporary and usually mild. Eyelid ptosis may be treated with special eye drops. Call your doctor if you have any questions or concerns after your treatment.     Who should I call with questions?    [facility]: [phone]     [facility]: [phone]    For urgent needs outside of business hours call the [facility] at [phone] and ask for the resident on call                Follow-ups after your visit        Who to contact     Please call your clinic at [phone] to:    Ask questions about your health    Make or cancel appointments    Discuss your medicines    Learn about your test results    Speak to your doctor            Additional Information About Your Visit        [employer] Information     [employer] is an electronic gateway that provides easy, online access to your medical records. With [employer], you can request a clinic appointment, read your test results, renew a prescription or communicate with your care team.     To sign up for [employer] visit the website at [email or web address]   You will be asked to enter the access code listed below, as well as some personal information. Please follow the directions to create your username and password.     Your access code is: [email or web address]  Expires: 2018 10:15 AM     Your access code will  in 90 days. If you need help or a new code, please contact your [facility] or call [phone] for assistance.        Care EveryWhere ID     This is your Care EveryWhere ID. This could be used by other organizations to access your [facility]  records  [id number]         Blood Pressure from Last 3 Encounters:   12/28/17 117/72   04/04/17 112/73    Weight from Last 3 Encounters:   12/28/17 61.1 kg (134 lb 12.8 oz)   04/04/17 59.4 kg (131 lb)              Today, you had the following     No orders found for display       Primary Care Provider    None Specified       No primary provider on file.        Equal Access to Services     [facility] Hospital[facility] : Hadii aad ku hadasho Sonimeshali, waaxda luqadaha, qaybta kaalmada adeegyada, waxay catrinain chuchon jerod turnermarquiselencho meza . So [facility] [phone].    ATENCIÓN: Si habla español, tiene a pollard disposición servicios gratuitos de asistencia lingüística. Llame al [phone].    We comply with applicable federal civil rights laws and [place] laws. We do not discriminate on the basis of race, color, national origin, age, disability, sex, sexual orientation, or gender identity.            Thank you!     Thank you for choosing [facility] DERMATOLOGY  for your care. Our goal is always to provide you with excellent care. Hearing back from our patients is one way we can continue to improve our services. Please take a few minutes to complete the written survey that you may receive in the mail after your visit with us. Thank you!             Your Updated Medication List - Protect others around you: Learn how to safely use, store and throw away your medicines at www.disposemymeds.org.          This list is accurate as of 4/5/18 10:15 AM.  Always use your most recent med list.                   Brand Name Dispense Instructions for use Diagnosis    DEPO-PROVERA IM      Every 3 months.

## 2018-04-05 NOTE — PROGRESS NOTES
Botulinum Injection Procedure Note:  Cosmetic    ATTENDING STAFF SURGEON: Dr. Nahomy Hudson MD    RESIDENT SURGEON: Hunter Campos MD     NURSE: Coni Barcenas     ANESTHESIA:   None    PREOPERATIVE DIAGNOSIS:   Crow's feet, Furrowing and Rhytides    LOCATION: Glabella, forehead, lateral canthi    LOT NO: [id number]     EXP DATE: 8/2019    OPERATION/PROCEDURE:   Intralesional botulinum toxin injection     Dilution with 0.5cc preserved sterile normal saline in a 50Unit Botox Vial.    Total units of botulinum toxin: 42     POSTOPERATIVE DIAGNOSIS:   SAME     PREPARATION:   Alcohol swab    DESCRIPTION OF OPERATION/PROCEDURE:   The nature and purpose of the procedure, associated risks including but not limited to bruising, headache or discomfort at the site(s), numbness, muscle twitching, brow or eyelid droop, headache, double vision, not enough effect or too much effect, difficulty whistling or drinking from a straw, loss of muscle tone, or infection. Possible consequences and complications, and alternative methods of treatment were explained in detail. The patient declined a personal or family history of neuromuscular disease prior to the procedure. The patient is not pregnant or breast feeding.A signed informed operative consent was obtained.    The patient was taken to the operative suite and properly positioned. The area to be treated was defined and confirmed by the patient and physician. The area for Botox injection was marked.    Cosmetic procedure: A total of 42 Units were injected into sites at the Glabella, forehead, lateral orbicularis oculi. The patient tolerated the procedure well and there were no complications noted. Patient was given wound care instructions and will follow-up PRN.     Dr. Nahomy Hudson MD was immediately available for the entire surgery and was physicially present for the key portions of the procedure.    Clinical Follow-Up: PRN    Staff Involved:  Resident(Hunter Campos  name)/Staff(as above)    Hunter Campos MD  PGY3 Dermatology  [phone]     *PRODUCT WAS DONATED FOR TEACHING PURPOSES; NO CHARGE.       Patient was seen and examined with the dermatology resident. I agree with the history, review of systems, physical examination, assessments and plan. I was present for the key portions of this procedure.    Nahomy Hudson MD  Professor and  Chair  Department of Dermatology  [facility]

## 2018-04-05 NOTE — Clinical Note
4/5/2018       RE: Consuelo Osborne  [address]  [place] [address]     Dear Colleague,    Thank you for referring your patient, Consuelo Osborne, to the [facility] DERMATOLOGY at [facility]. Please see a copy of my visit note below.    Botulinum Injection Procedure Note:  Cosmetic    ATTENDING STAFF SURGEON: Dr. Nahomy Hudson MD    RESIDENT SURGEON: Hunter Campos MD     NURSE: Coni Barcenas     ANESTHESIA:   None    PREOPERATIVE DIAGNOSIS:   Crow's feet, Furrowing and Rhytides    LOCATION: Glabella, forehead, lateral canthi    LOT NO: [id number]     EXP DATE: 8/2019    OPERATION/PROCEDURE:   Intralesional botulinum toxin injection     Dilution with 0.5cc preserved sterile normal saline in a 50Unit Botox Vial.    Total units of botulinum toxin: 42     POSTOPERATIVE DIAGNOSIS:   SAME     PREPARATION:   Alcohol swab    DESCRIPTION OF OPERATION/PROCEDURE:   The nature and purpose of the procedure, associated risks including but not limited to bruising, headache or discomfort at the site(s), numbness, muscle twitching, brow or eyelid droop, headache, double vision, not enough effect or too much effect, difficulty whistling or drinking from a straw, loss of muscle tone, or infection. Possible consequences and complications, and alternative methods of treatment were explained in detail. The patient declined a personal or family history of neuromuscular disease prior to the procedure. The patient is not pregnant or breast feeding.A signed informed operative consent was obtained.    The patient was taken to the operative suite and properly positioned. The area to be treated was defined and confirmed by the patient and physician. The area for Botox injection was marked.    Cosmetic procedure: A total of 42 Units were injected into sites at the Glabella, forehead, lateral orbicularis oculi. The patient tolerated the procedure well and there were no complications noted. Patient was given  wound care instructions and will follow-up PRN.     Dr. Nahomy Hudson MD* was immediately available for the entire surgery and was physicially present for the key portions of the procedure.    Clinical Follow-Up: PRN    Staff Involved:  Resident(Hunter Campos name)/Staff(as above)    Hunter Campos MD  PGY3 Dermatology  [phone]     *PRODUCT WAS DONATED FOR TEACHING PURPOSES; NO CHARGE.               Again, thank you for allowing me to participate in the care of your patient.      Sincerely,    Hunter Campos MD

## 2018-04-05 NOTE — LETTER
Date:April 13, 2018      Patient was self referred, no letter generated. Do not send.        [facility] Information

## 2018-04-05 NOTE — PATIENT INSTRUCTIONS
Botulinum Toxin(Botox/Dysport) Cosmetic Information      I will have pain, redness, and swelling. I may have bruising, headache or discomfort at the site(s). Risks are asymmetry, numbness, twitching, brow droop, eyelid droop, headache, double vision, not enough effect or too much effect, difficulty whistling or drinking, loss of muscle tone, headache or infection. A touch-up or multiple treatments may be required.      About Botulinum Toxin (Botox/Dysport)  You have inquired about Botox cosmetic. Botulinum toxin is a purified protein derivative developed from bacteria. It has the ability to immobilize facial muscles that create dynamic wrinkles. Dynamic wrinkles develop due to muscle contraction, and over time become permanent folds in the skin, even when the muscles are not flexed. The use of Botox results in a very pleasing cosmetic effect for many people, leading to a more youthful, relaxed appearance. Botox can be used in combination with injectable fillers, chemical peels, and laser resurfacing to treat deeper wrinkles. It also has become an accepted form of treatment for hyperhidrosis, (or excessive sweating) in people who have not responded to other therapies.     With my treatment side effects may include bruising, headache or discomfort at the site(s). Asymmetry may occur and a touch-up may be required. Risks of this procedure include numbness, muscle twitching, brow or eyelid droop, headache, double vision, not enough effect or too much effect, difficulty whistling or drinking from a straw, loss of muscle tone, headache or infection      Post-Procedure Instructions:  Shower, facial cleansing, use of make-up and medicated creams is not restricted. Do not rub the treated area. Avoid exercise for the 24 hours following the procedure. Some people will experience bruising or eyelid ptosis (drooping) after injection. This is temporary and usually mild. Eyelid ptosis may be treated with special eye drops. Call  your doctor if you have any questions or concerns after your treatment.     Who should I call with questions?    [facility]: [phone]     [facility]: [phone]    For urgent needs outside of business hours call the [facility] at [phone] and ask for the resident on call

## 2018-04-05 NOTE — NURSING NOTE
Dermatology Rooming Note    Consuelo Osborne's goals for this visit include:   Chief Complaint   Patient presents with     Derm Problem     Consuelo is here for botox.     Consuelo Osborne,NANN

## 2018-05-11 ENCOUNTER — OFFICE VISIT (OUTPATIENT)
Dept: DERMATOLOGY | Facility: CLINIC | Age: 38
End: 2018-05-11
Payer: COMMERCIAL

## 2018-05-11 DIAGNOSIS — R23.8 FACIAL VOLUME DEPLETION: Primary | ICD-10-CM

## 2018-05-11 ASSESSMENT — PAIN SCALES - GENERAL: PAINLEVEL: NO PAIN (0)

## 2018-05-11 NOTE — MR AVS SNAPSHOT
After Visit Summary   5/11/2018    Consuelo Osborne    MRN: [id number]           Patient Information     Date Of Birth          1980        Visit Information        Provider Department      5/11/2018 3:45 PM Priti Castillo MD [facility] Dermatology        Care Instructions    Filler Information:    I will have pain, bruising, redness, and swelling after the procedure and lasting for approximately 1-3 weeks. Risks are lumps/bumps, skin discoloration, bleeding, numbness, infection, granuloma formation, scar, ulceration, under correction, over correction and rarely, risks of stroke and blindness. Multiple treatments may be required.        Dermal fillers are injected into the skin to soften crease or folds, support areas of volume loss or contour specific facial areas.     You may experience a mild to moderate amount of stinging or aching sensation post injection.    To ensure an even correction, the physician will massage the area treated, which may cause a temporary amount of redness to your skin.    Bruising at the site of injection is common and may last two weeks    Temporary minimal to moderate swelling can be expected, which should gradually improve following injection    It is normal to experience some tenderness at the treatment site for a few days    After treatment care instructions:    Apply an ice pack or cold compress to the injection area after treatment to help reduce swelling.    Keep your head elevated(even while sleeping) as much as possible and avoid sleeping on your side or stomach    No alcohol consumption or exercise for the first 24 hours after treatment.    Do not touch the treated area for 6 hours    You may use make-up the following day    You may return to normal/routine activities but you should check with your physician for their recommendation     Avoid excessive scrubbing or rubbing of the injection area    If you have previously suffered from cold sores, there is a risk that  the needle punctures around the mouth/lips could contribute to another recurrence    Immediately report to your physician if you have any of the following:    Delayed swelling happening 7-14 days after treatment    Numbness lasting 3-4 days    Muscle weakness in the area of injection    Severe pain    Dusky discoloration of one half of the face    Changes in vision or eye pain    Cold sore    Who should I call with questions?    [facility]: [phone]     [facility]: [phone]    For urgent needs outside of business hours call the [facility] at [phone] and ask for the resident on call              Follow-ups after your visit        Who to contact     Please call your clinic at [phone] to:    Ask questions about your health    Make or cancel appointments    Discuss your medicines    Learn about your test results    Speak to your doctor            Additional Information About Your Visit        MyChart Information     [employer] is an electronic gateway that provides easy, online access to your medical records. With [employer], you can request a clinic appointment, read your test results, renew a prescription or communicate with your care team.     To sign up for [employer] visit the website at [email or web address]   You will be asked to enter the access code listed below, as well as some personal information. Please follow the directions to create your username and password.     Your access code is: [email or web address]  Expires: 2018 10:15 AM     Your access code will  in 90 days. If you need help or a new code, please contact your [facility] or call [phone] for assistance.        Care EveryWhere ID     This is your Care EveryWhere ID. This could be used by other organizations to access your [place] medical records  [id number]         Blood Pressure from Last 3 Encounters:   17 117/72    04/04/17 112/73    Weight from Last 3 Encounters:   12/28/17 61.1 kg (134 lb 12.8 oz)   04/04/17 59.4 kg (131 lb)              Today, you had the following     No orders found for display       Primary Care Provider    None Specified       No primary provider on file.        Equal Access to Services     JANNETH LINARES : Hadii kenny bullardo Sorocio, waaxda luqadaha, qaybta kaalmada alyssia, sharon meza . So [facility] [phone].    ATENCIÓN: Si habla español, tiene a pollard disposición servicios gratuitos de asistencia lingüística. Llame al [phone].    We comply with applicable federal civil rights laws and [place] laws. We do not discriminate on the basis of race, color, national origin, age, disability, sex, sexual orientation, or gender identity.            Thank you!     Thank you for choosing [facility] DERMATOLOGY  for your care. Our goal is always to provide you with excellent care. Hearing back from our patients is one way we can continue to improve our services. Please take a few minutes to complete the written survey that you may receive in the mail after your visit with us. Thank you!             Your Updated Medication List - Protect others around you: Learn how to safely use, store and throw away your medicines at www.disposemymeds.org.          This list is accurate as of 5/11/18  4:43 PM.  Always use your most recent med list.                   Brand Name Dispense Instructions for use Diagnosis    DEPO-PROVERA IM      Every 3 months.

## 2018-05-11 NOTE — LETTER
5/11/2018       RE: Consuelo Osborne  [address]  [place] [address]     Dear Colleague,    Thank you for referring your patient, Consuelo Osborne, to the [facility] DERMATOLOGY at [facility]. Please see a copy of my visit note below.    Soft Tissue Augmentation Procedure Note: Cosmetic    Procedure Date: 5/11/2018    Attending Staff: Priti Castillo MD    Resident: Pancho Acuña    Assistant: Coni    Diagnosis: Facial rhytides and loss of central facial volume    Location: zygomatic cheeks  Product: Juvederm Ultra Plus XC  Amount: 1.0 cc  Lot #: [id number]  Exp Date: 2018-09-10    Location: Chin/melolabial  Product: Juvederm Ultra Plus XC  Amount: 1.0 cc  Lot #: [id number]  Exp Date: 2018-09-10      Description of Operation/Procedure:   The nature and purpose of the procedure, associated risks, possible consequences and complications, and alternative methods of treatment were explained in detail including but not limited to bruising, pain, redness,lumps/bumps, granuloma formation, scar blindness, stroke, ulceration, ischemia, under correction, over correction, swelling, possible need for multiple treatments, infection, granuloma, pain, dyspigmentation, numbness, weakness or tingling were explained to the patient. We discussed that multiple treatments may be required.Discussion of FDA on-label and off-label use was completed and disclosure for any sites treated off-label versus on-label was provided to patient. The patient verbalized understanding. Photo consent and signed informed consent were obtained.Time-out was performed and patient denied history of severe allergy to bees.      The facial areas were cleansed with ETOH swab and injections were performed.  The patient tolerated the procedure well and there were no complications. Ice was provided post-procedure. The patient was provided after care instructions and will follow-up in 1 week.     Donate product  utilized; no charge.     Dr. Castillo staffed the patient.     Staff:  Resident(Hunter Campos name)/Staff(as above)      Again, thank you for allowing me to participate in the care of your patient.      Sincerely,    Priti Castillo MD

## 2018-05-11 NOTE — PROGRESS NOTES
Soft Tissue Augmentation Procedure Note: Cosmetic    Procedure Date: 5/11/2018    Attending Staff: Priti Castillo MD    Resident: Pancho Acuña    Assistant: Coni    Diagnosis: Facial rhytides and loss of central facial volume    Location: zygomatic cheeks  Product: Juvederm Ultra Plus XC  Amount: 1.0 cc  Lot #: [id number]  Exp Date: 2018-09-10    Location: Chin/melolabial  Product: Juvederm Ultra Plus XC  Amount: 1.0 cc  Lot #: [id number]  Exp Date: 2018-09-10      Description of Operation/Procedure:   The nature and purpose of the procedure, associated risks, possible consequences and complications, and alternative methods of treatment were explained in detail including but not limited to bruising, pain, redness,lumps/bumps, granuloma formation, scar blindness, stroke, ulceration, ischemia, under correction, over correction, swelling, possible need for multiple treatments, infection, granuloma, pain, dyspigmentation, numbness, weakness or tingling were explained to the patient. We discussed that multiple treatments may be required.Discussion of FDA on-label and off-label use was completed and disclosure for any sites treated off-label versus on-label was provided to patient. The patient verbalized understanding. Photo consent and signed informed consent were obtained.Time-out was performed and patient denied history of severe allergy to bees.      The facial areas were cleansed with ETOH swab and injections were performed.  The patient tolerated the procedure well and there were no complications. Ice was provided post-procedure. The patient was provided after care instructions and will follow-up in 1 week.     Donate product utilized; no charge.     Dr. Castillo staffed the patient.     Staff:  Resident(Hunter Campos name)/Staff(as above)

## 2018-05-11 NOTE — PATIENT INSTRUCTIONS
Filler Information:    I will have pain, bruising, redness, and swelling after the procedure and lasting for approximately 1-3 weeks. Risks are lumps/bumps, skin discoloration, bleeding, numbness, infection, granuloma formation, scar, ulceration, under correction, over correction and rarely, risks of stroke and blindness. Multiple treatments may be required.        Dermal fillers are injected into the skin to soften crease or folds, support areas of volume loss or contour specific facial areas.     You may experience a mild to moderate amount of stinging or aching sensation post injection.    To ensure an even correction, the physician will massage the area treated, which may cause a temporary amount of redness to your skin.    Bruising at the site of injection is common and may last two weeks    Temporary minimal to moderate swelling can be expected, which should gradually improve following injection    It is normal to experience some tenderness at the treatment site for a few days    After treatment care instructions:    Apply an ice pack or cold compress to the injection area after treatment to help reduce swelling.    Keep your head elevated(even while sleeping) as much as possible and avoid sleeping on your side or stomach    No alcohol consumption or exercise for the first 24 hours after treatment.    Do not touch the treated area for 6 hours    You may use make-up the following day    You may return to normal/routine activities but you should check with your physician for their recommendation     Avoid excessive scrubbing or rubbing of the injection area    If you have previously suffered from cold sores, there is a risk that the needle punctures around the mouth/lips could contribute to another recurrence    Immediately report to your physician if you have any of the following:    Delayed swelling happening 7-14 days after treatment    Numbness lasting 3-4 days    Muscle weakness in the area of  injection    Severe pain    Dusky discoloration of one half of the face    Changes in vision or eye pain    Cold sore    Who should I call with questions?    [facility]: [phone]     [facility]: [phone]    For urgent needs outside of business hours call the [facility] at [phone] and ask for the resident on call

## 2018-05-25 ENCOUNTER — RECORDS - HEALTHEAST (OUTPATIENT)
Dept: LAB | Facility: CLINIC | Age: 38
End: 2018-05-25

## 2018-05-27 LAB — BACTERIA SPEC CULT: NORMAL

## 2019-05-03 ENCOUNTER — OFFICE VISIT (OUTPATIENT)
Dept: DERMATOLOGY | Facility: CLINIC | Age: 39
End: 2019-05-03
Payer: COMMERCIAL

## 2019-05-03 DIAGNOSIS — L98.8 RHYTIDES: Primary | ICD-10-CM

## 2019-05-03 NOTE — Clinical Note
5/3/2019       RE: Consuelo Osborne  [address]  [place] [address]     Dear Colleague,    Thank you for referring your patient, Consuelo Osborne, to the [facility] DERMATOLOGY at [facility]. Please see a copy of my visit note below.    See procedure note.     Again, thank you for allowing me to participate in the care of your patient.      Sincerely,    Catherine Merida MD

## 2019-05-03 NOTE — PROCEDURES
Botulinum Injection Procedure Note:  Cosmetic    ATTENDING STAFF SURGEON: Dr. Merida    RESIDENT SURGEON: SAUNDRA     NURSE: Lana     ANESTHESIA:   None    PREOPERATIVE DIAGNOSIS:   Rhytides    LOCATION: forehead, glabella    LOT NO: [id number]      EXP DATE: 02/2021    OPERATION/PROCEDURE:   Intralesional botulinum toxin injection     Dilution with 0.5 preserved sterile normal saline in a 50Unit Botox.    Total units of botulinum toxin: 34    POSTOPERATIVE DIAGNOSIS:   SAME     PREPARATION:   Alcohol swab    DESCRIPTION OF OPERATION/PROCEDURE:   The nature and purpose of the procedure, associated risks including but not limited to bruising, headache or discomfort at the site(s), numbness, muscle twitching, brow or eyelid droop, headache, double vision, not enough effect or too much effect, difficulty whistling or drinking from a straw, loss of muscle tone, or infection. Possible consequences and complications, and alternative methods of treatment were explained in detail. The patient declined a personal or family history of neuromuscular disease prior to the procedure. A signed informed operative consent was obtained.    The patient was taken to the operative suite and properly positioned. The area to be treated was defined and confirmed by the patient and physician. The area for Botox injection was marked.    Cosmetic procedure: A total of 34 Units were injected into sites at the forehead, glabella. The patient tolerated the procedure well and there were no complications noted. Patient was given wound care instructions and will follow-up in approximately 14 days.     Dr. Merida was immediately available for the entire surgery and was physicially present for the key portions of the procedure.    No charge - free product was used.     Clinical Follow-Up: prn    Staff Involved:  Scribe/Staff    Scribe Disclosure  I, Shagufta Mon, am serving as a scribe to document services personally performed by Dr. Alexander  MD [place], based on data collection and the provider's statements to me.     Provider Disclosure:   The documentation recorded by the scribe accurately reflects the services I personally performed and the decisions made by me.    Catherine Merida MD    Department of Dermatology  [facility]: Phone: [phone], Fax:[phone]  [facility]: Phone: [phone], Fax: [phone]

## 2019-05-03 NOTE — LETTER
Date:May 9, 2019      Patient was self referred, no letter generated. Do not send.        [facility] Health Information

## 2019-07-05 ENCOUNTER — RECORDS - HEALTHEAST (OUTPATIENT)
Dept: LAB | Facility: CLINIC | Age: 39
End: 2019-07-05

## 2019-07-05 LAB
ALBUMIN SERPL-MCNC: 4.2 G/DL (ref 3.5–5)
ALP SERPL-CCNC: 51 U/L (ref 45–120)
ALT SERPL W P-5'-P-CCNC: 21 U/L (ref 0–45)
ANION GAP SERPL CALCULATED.3IONS-SCNC: 8 MMOL/L (ref 5–18)
AST SERPL W P-5'-P-CCNC: 20 U/L (ref 0–40)
BILIRUB SERPL-MCNC: 0.2 MG/DL (ref 0–1)
BUN SERPL-MCNC: 14 MG/DL (ref 8–22)
C REACTIVE PROTEIN LHE: <0.1 MG/DL (ref 0–0.8)
CALCIUM SERPL-MCNC: 10.5 MG/DL (ref 8.5–10.5)
CHLORIDE BLD-SCNC: 104 MMOL/L (ref 98–107)
CHOLEST SERPL-MCNC: 186 MG/DL
CO2 SERPL-SCNC: 27 MMOL/L (ref 22–31)
CREAT SERPL-MCNC: 0.76 MG/DL (ref 0.6–1.1)
ERYTHROCYTE [SEDIMENTATION RATE] IN BLOOD BY WESTERGREN METHOD: 6 MM/HR (ref 0–20)
FASTING STATUS PATIENT QL REPORTED: ABNORMAL
FOLATE SERPL-MCNC: >20 NG/ML
GFR SERPL CREATININE-BSD FRML MDRD: >60 ML/MIN/1.73M2
GLUCOSE BLD-MCNC: 95 MG/DL (ref 70–125)
HDLC SERPL-MCNC: 46 MG/DL
LDLC SERPL CALC-MCNC: 85 MG/DL
POTASSIUM BLD-SCNC: 4.2 MMOL/L (ref 3.5–5)
PROT SERPL-MCNC: 6.9 G/DL (ref 6–8)
SODIUM SERPL-SCNC: 139 MMOL/L (ref 136–145)
TRIGL SERPL-MCNC: 273 MG/DL
TSH SERPL DL<=0.005 MIU/L-ACNC: 0.8 UIU/ML (ref 0.3–5)
VIT B12 SERPL-MCNC: 798 PG/ML (ref 213–816)

## 2019-07-07 LAB — HBA1C MFR BLD: 5.6 % (ref 4.2–6.1)

## 2019-07-08 LAB
HAV IGM SERPL QL IA: NEGATIVE
HBV CORE IGM SERPL QL IA: NEGATIVE
HBV SURFACE AG SERPL QL IA: NEGATIVE
HCV AB SERPL QL IA: NEGATIVE

## 2019-07-09 LAB — ANA SER QL: 0.3 U

## 2019-08-01 ENCOUNTER — TELEPHONE (OUTPATIENT)
Dept: DERMATOLOGY | Facility: CLINIC | Age: 39
End: 2019-08-01

## 2019-08-01 DIAGNOSIS — L70.0 ACNE VULGARIS: Primary | ICD-10-CM

## 2019-08-01 RX ORDER — TRETINOIN 0.25 MG/G
CREAM TOPICAL AT BEDTIME
Qty: 45 G | Refills: 11 | Status: SHIPPED | OUTPATIENT
Start: 2019-08-01

## 2019-08-02 NOTE — TELEPHONE ENCOUNTER
Prior Authorization Approval    Medication: tretinoin (RETIN-A) 0.025 % external cream - APPROVED was approved on 8/2/2019  Effective: 7/3/2019 to 8/1/2022  Reference #:    Approved Dose/Quantity:   Insurance Company: [employer] - Phone [phone] Fax [phone]  Expected CoPay:    Pharmacy Filling the Rx: [id number] - [place], [place] - [address] AT [facility] [address]  Pharmacy Notified: Yes  Patient Notified: Comment:  **Instructed pharmacy to notify patient when script is ready to /ship.**

## 2019-08-02 NOTE — TELEPHONE ENCOUNTER
PA Initiation    Medication: tretinoin (RETIN-A) 0.025 % external cream -   Insurance Company: [employer] - Phone [phone] Fax [phone]  Pharmacy Filling the Rx: [facility] #[id number] - [place], [place] - [facility] [address] AT [facility] [address]  Filling Pharmacy Phone: [phone]  Filling Pharmacy Fax: [phone]  Start Date: 8/2/2019

## 2019-10-24 DIAGNOSIS — R42 DIZZINESS: Primary | ICD-10-CM

## 2020-07-29 ENCOUNTER — VIRTUAL VISIT (OUTPATIENT)
Dept: FAMILY MEDICINE | Facility: OTHER | Age: 40
End: 2020-07-29
Payer: COMMERCIAL

## 2020-07-29 PROCEDURE — 99421 OL DIG E/M SVC 5-10 MIN: CPT | Performed by: PHYSICIAN ASSISTANT

## 2020-07-30 NOTE — PROGRESS NOTES
"Date: 2020 17:22:49  Clinician: London Nixon  Clinician NPI: [id number]  Patient: Consuelo Read  Patient : 1980  Patient Address: [address], [place] [address]  Patient Phone: [phone]  Visit Protocol: URI  Patient Summary:  Consuelo is a 39 year old ( : 1980 ) female who initiated a Visit for COVID-19 (Coronavirus) evaluation and screening. When asked the question \"Please sign me up to receive news, health information and promotions from [employer].\", Consuelo responded \"No\".    Consuelo states her symptoms started today.   Her symptoms consist of a sore throat, a cough, nasal congestion, malaise, and chills. She is experiencing difficulty breathing due to nasal congestion but she is not short of breath. Consuelo also feels feverish but was unable to measure her temperature.   Symptom details     Nasal secretions: The color of her mucus is clear.    Cough: Consuelo coughs a few times an hour and her cough is not more bothersome at night. Phlegm does not come into her throat when she coughs. She does not believe her cough is caused by post-nasal drip.     Sore throat: Consuelo reports having mild throat pain (1-3 on a 10 point pain scale), does not have exudate on her tonsils, and can swallow liquids. The lymph nodes in her neck are not enlarged. A rash has not appeared on the skin since the sore throat started.      Consuelo denies having wheezing, nausea, teeth pain, ageusia, diarrhea, myalgias, anosmia, facial pain or pressure, vomiting, rhinitis, ear pain, headache, and enlarged lymph nodes. She also denies having recent facial or sinus surgery in the past 60 days and taking antibiotic medication in the past month.   Precipitating events  Within the past week, Consuelo has not been exposed to someone with strep throat. She has not recently been exposed to someone with influenza. Consuelo has been in close contact with the following high risk individuals: adults 65 or older, pregnant women, people with asthma, heart " disease or diabetes, and immunocompromised people.   Pertinent COVID-19 (Coronavirus) information  In the past 14 days, Consuelo has not worked in a congregate living setting.   She either works or volunteers as a healthcare worker or a , or works or volunteers in a healthcare facility. 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 She lives with a healthcare worker.   Consuelo has had a close contact with a laboratory-confirmed COVID-19 patient within 14 days of symptom onset. She was exposed at her work. Additional information about contact with COVID-19 (Coronavirus) patient as reported by the patient (free text): 7/20. Coworker that was in office that day has been diagnosed with covid   Pertinent medical history  Consuelo does not get yeast infections when she takes antibiotics.   Consuelo does not need a return to work/school note.   Weight: 150 lbs   Consuelo smokes or uses smokeless tobacco.   She denies pregnancy and denies breastfeeding. She does not menstruate.   Weight: 150 lbs    MEDICATIONS: Zyrtec oral, Depo-Medrol injection, ALLERGIES: NKDA  Clinician Response:  Dear Consuelo,   Your symptoms show that you may have coronavirus (COVID-19). This illness can cause fever, cough and trouble breathing. Many people get a mild case and get better on their own. Some people can get very sick.  What should I do?  We would like to test you for this virus.   1. Please call [phone] to schedule your visit. Explain that [facility]-19 test. Be ready to share your [facility] visit ID number.  The following will serve as your written order for this COVID Test, ordered by me, for the indication of suspected COVID [Z20.828]: The test will be ordered in [employer], our electronic health record, after you are scheduled. It will show as ordered and  "authorized by Nawaf Gupta MD.  Order: COVID-19 (Coronavirus) PCR for SYMPTOMATIC testing from [facility].      2. When it's time for your COVID test:  Stay at least 6 feet away from others. (If someone will drive you to your test, stay in the backseat, as far away from the  as you can.)   Cover your mouth and nose with a mask, tissue or washcloth.  Go straight to the testing site. Don't make any stops on the way there or back.      3.Starting now: Stay home and away from others (self-isolate) until:   You've had no fever---and no medicine that reduces fever---for 3 full days (72 hours). And...   Your other symptoms have gotten better. For example, your cough or breathing has improved. And...   At least 10 days have passed since your symptoms started.       During this time, don't leave the house except for testing or medical care.   Stay in your own room, even for meals. Use your own bathroom if you can.   Stay away from others in your home. No hugging, kissing or shaking hands. No visitors.  Don't go to work, school or anywhere else.    Clean \"high touch\" surfaces often (doorknobs, counters, handles, etc.). Use a household cleaning spray or wipes. You'll find a full list of  on the EPA website: www.epa.gov/pesticide-registration/list-n-disinfectants-use-against-sars-cov-2.   Cover your mouth and nose with a mask, tissue or washcloth to avoid spreading germs.  Wash your hands and face often. Use soap and water.  Caregivers in these groups are at risk for severe illness due to COVID-19:  o People 65 years and older  o People who live in a nursing home or long-term care facility  o People with chronic disease (lung, heart, cancer, diabetes, kidney, liver, immunologic)  o People who have a weakened immune system, including those who:   Are in cancer treatment  Take medicine that weakens the immune system, such as corticosteroids  Had a bone marrow or organ transplant  Have an immune deficiency  Have poorly " controlled HIV or AIDS  Are obese (body mass index of 40 or higher)  Smoke regularly   o Caregivers should wear gloves while washing dishes, handling laundry and cleaning bedrooms and bathrooms.  o Use caution when washing and drying laundry: Don't shake dirty laundry, and use the warmest water setting that you can.  o For more tips, go to www.cdc.gov/coronavirus/2019-ncov/downloads/10Things.pdf.    4.Sign up for [employer]. We know it's scary to hear that you might have COVID-19. We want to track your symptoms to make sure you're okay over the next 2 weeks. Please look for an email from [employer]---this is a free, online program that we'll use to keep in touch. To sign up, follow the link in the email. Learn more at [email or web address]  How can I take care of myself?   Get lots of rest. Drink extra fluids (unless a doctor has told you not to).   Take Tylenol (acetaminophen) for fever or pain. If you have liver or kidney problems, ask your family doctor if it's okay to take Tylenol.   Adults can take either:    650 mg (two 325 mg pills) every 4 to 6 hours, or...   1,000 mg (two 500 mg pills) every 8 hours as needed.    Note: Don't take more than 3,000 mg in one day. Acetaminophen is found in many medicines (both prescribed and over-the-counter medicines). Read all labels to be sure you don't take too much.   For children, check the Tylenol bottle for the right dose. The dose is based on the child's age or weight.    If you have other health problems (like cancer, heart failure, an organ transplant or severe kidney disease): Call your specialty clinic if you don't feel better in the next 2 days.       Know when to call 911. Emergency warning signs include:    Trouble breathing or shortness of breath Pain or pressure in the chest that doesn't go away Feeling confused like you haven't felt before, or not being able to wake up Bluish-colored lips or face.  Where can I get more information?   [facility]  [facility] -- About COVID-19: [email or web address]   CDC -- What to Do If You're Sick: www.cdc.gov/coronavirus/2019-ncov/about/steps-when-sick.html   CDC -- Ending Home Isolation: www.cdc.gov/coronavirus/2019-ncov/hcp/disposition-in-home-patients.html   [facility] -- Caring for Someone: www.cdc.gov/coronavirus/2019-ncov/if-you-are-sick/care-for-someone.html   [facility] -- Interim Guidance for Hospital Discharge to Home: www.health.[facility].[place]./diseases/coronavirus/hcp/hospdischarge.pdf   [facility] clinical trials (COVID-19 research studies): clinicalaffairs.[facility]-clinical-trials    Below are the COVID-19 hotlines at the [place] Department of Health ([facility]). Interpreters are available.    For health questions: Call 110-080-5706 or 1-118.575.4656 (7 a.m. to 7 p.m.) For questions about schools and childcare: Call 778-750-4050 or 1-902.381.1140 (7 a.m. to 7 p.m.)    Diagnosis: Acute upper respiratory infection, unspecified  Diagnosis ICD: J06.9  Additional Clinician Notes: if your symptoms are not improving or worsen, Please go to one of our urgent care locations for evaluation.

## 2020-07-31 ENCOUNTER — OFFICE VISIT (OUTPATIENT)
Dept: LAB | Facility: CLINIC | Age: 40
End: 2020-07-31
Payer: COMMERCIAL

## 2020-07-31 DIAGNOSIS — Z20.822 SUSPECTED 2019 NOVEL CORONAVIRUS INFECTION: Primary | ICD-10-CM

## 2020-08-01 LAB
SARS-COV-2 RNA SPEC QL NAA+PROBE: NOT DETECTED
SPECIMEN SOURCE: NORMAL

## 2020-10-01 ENCOUNTER — RECORDS - HEALTHEAST (OUTPATIENT)
Dept: LAB | Facility: CLINIC | Age: 40
End: 2020-10-01

## 2020-10-01 LAB
CHOLEST SERPL-MCNC: 175 MG/DL
FASTING STATUS PATIENT QL REPORTED: NO
HDLC SERPL-MCNC: 40 MG/DL
LDLC SERPL CALC-MCNC: 86 MG/DL
TRIGL SERPL-MCNC: 246 MG/DL

## 2020-10-02 LAB
HPV SOURCE: NORMAL
HUMAN PAPILLOMA VIRUS 16 DNA: NEGATIVE
HUMAN PAPILLOMA VIRUS 18 DNA: NEGATIVE
HUMAN PAPILLOMA VIRUS FINAL DIAGNOSIS: NORMAL
HUMAN PAPILLOMA VIRUS OTHER HR: NEGATIVE
SPECIMEN DESCRIPTION: NORMAL

## 2020-10-09 LAB
BKR LAB AP ABNORMAL BLEEDING: NO
BKR LAB AP BIRTH CONTROL/HORMONES: NORMAL
BKR LAB AP CERVICAL APPEARANCE: NORMAL
BKR LAB AP GYN ADEQUACY: NORMAL
BKR LAB AP GYN INTERPRETATION: NORMAL
BKR LAB AP HPV REFLEX: NORMAL
BKR LAB AP LMP: NORMAL
BKR LAB AP PATIENT STATUS: NO
BKR LAB AP PREVIOUS ABNORMAL: NORMAL
BKR LAB AP PREVIOUS NORMAL: NORMAL
HIGH RISK?: NO
PATH REPORT.COMMENTS IMP SPEC: NORMAL
RESULT FLAG (HE HISTORICAL CONVERSION): NORMAL

## 2020-11-11 ENCOUNTER — RECORDS - HEALTHEAST (OUTPATIENT)
Dept: LAB | Facility: CLINIC | Age: 40
End: 2020-11-11

## 2020-11-11 LAB — TSH SERPL DL<=0.005 MIU/L-ACNC: 1.22 UIU/ML (ref 0.3–5)

## 2020-12-27 ENCOUNTER — HEALTH MAINTENANCE LETTER (OUTPATIENT)
Age: 40
End: 2020-12-27

## 2021-03-23 ENCOUNTER — RECORDS - HEALTHEAST (OUTPATIENT)
Dept: LAB | Facility: CLINIC | Age: 41
End: 2021-03-23

## 2021-03-23 LAB
BASOPHILS # BLD AUTO: 0.1 THOU/UL (ref 0–0.2)
BASOPHILS NFR BLD AUTO: 1 % (ref 0–2)
EOSINOPHIL # BLD AUTO: 0.2 THOU/UL (ref 0–0.4)
EOSINOPHIL NFR BLD AUTO: 3 % (ref 0–6)
ERYTHROCYTE [DISTWIDTH] IN BLOOD BY AUTOMATED COUNT: 12.6 % (ref 11–14.5)
HCG SERPL QL: NEGATIVE
HCT VFR BLD AUTO: 40.1 % (ref 35–47)
HGB BLD-MCNC: 13.2 G/DL (ref 12–16)
IMM GRANULOCYTES # BLD: 0 THOU/UL
IMM GRANULOCYTES NFR BLD: 0 %
LYMPHOCYTES # BLD AUTO: 2.4 THOU/UL (ref 0.8–4.4)
LYMPHOCYTES NFR BLD AUTO: 30 % (ref 20–40)
MCH RBC QN AUTO: 30.6 PG (ref 27–34)
MCHC RBC AUTO-ENTMCNC: 32.9 G/DL (ref 32–36)
MCV RBC AUTO: 93 FL (ref 80–100)
MONOCYTES # BLD AUTO: 0.5 THOU/UL (ref 0–0.9)
MONOCYTES NFR BLD AUTO: 6 % (ref 2–10)
NEUTROPHILS # BLD AUTO: 4.7 THOU/UL (ref 2–7.7)
NEUTROPHILS NFR BLD AUTO: 60 % (ref 50–70)
PLATELET # BLD AUTO: 150 THOU/UL (ref 140–440)
PMV BLD AUTO: 12.7 FL (ref 8.5–12.5)
RBC # BLD AUTO: 4.31 MILL/UL (ref 3.8–5.4)
TSH SERPL DL<=0.005 MIU/L-ACNC: 0.73 UIU/ML (ref 0.3–5)
WBC: 7.9 THOU/UL (ref 4–11)

## 2021-10-09 ENCOUNTER — HEALTH MAINTENANCE LETTER (OUTPATIENT)
Age: 41
End: 2021-10-09

## 2022-01-29 ENCOUNTER — HEALTH MAINTENANCE LETTER (OUTPATIENT)
Age: 42
End: 2022-01-29

## 2022-07-19 ENCOUNTER — LAB REQUISITION (OUTPATIENT)
Dept: LAB | Facility: CLINIC | Age: 42
End: 2022-07-19

## 2022-07-19 DIAGNOSIS — M79.89 OTHER SPECIFIED SOFT TISSUE DISORDERS: ICD-10-CM

## 2022-07-19 LAB — ERYTHROCYTE [SEDIMENTATION RATE] IN BLOOD BY WESTERGREN METHOD: 10 MM/HR (ref 0–20)

## 2022-07-19 PROCEDURE — 85652 RBC SED RATE AUTOMATED: CPT | Performed by: PHYSICIAN ASSISTANT

## 2022-07-19 PROCEDURE — 86140 C-REACTIVE PROTEIN: CPT | Performed by: PHYSICIAN ASSISTANT

## 2022-07-19 PROCEDURE — 86038 ANTINUCLEAR ANTIBODIES: CPT | Performed by: PHYSICIAN ASSISTANT

## 2022-07-19 PROCEDURE — 86431 RHEUMATOID FACTOR QUANT: CPT | Performed by: PHYSICIAN ASSISTANT

## 2022-07-19 PROCEDURE — 84550 ASSAY OF BLOOD/URIC ACID: CPT | Performed by: PHYSICIAN ASSISTANT

## 2022-07-20 LAB
ANA SER QL IF: NEGATIVE
CRP SERPL-MCNC: <3 MG/L
RHEUMATOID FACT SER NEPH-ACNC: <6 IU/ML
URATE SERPL-MCNC: 3.8 MG/DL (ref 2.4–5.7)

## 2022-09-11 ENCOUNTER — HEALTH MAINTENANCE LETTER (OUTPATIENT)
Age: 42
End: 2022-09-11

## 2023-04-10 DIAGNOSIS — F32.4 MAJOR DEPRESSIVE DISORDER IN PARTIAL REMISSION, UNSPECIFIED WHETHER RECURRENT (H): Primary | ICD-10-CM

## 2023-05-06 ENCOUNTER — HEALTH MAINTENANCE LETTER (OUTPATIENT)
Age: 43
End: 2023-05-06

## 2023-07-23 DIAGNOSIS — F32.4 MAJOR DEPRESSIVE DISORDER IN PARTIAL REMISSION, UNSPECIFIED WHETHER RECURRENT (H): ICD-10-CM

## 2023-07-27 DIAGNOSIS — F32.4 MAJOR DEPRESSIVE DISORDER IN PARTIAL REMISSION, UNSPECIFIED WHETHER RECURRENT (H): ICD-10-CM

## 2024-02-24 ENCOUNTER — HEALTH MAINTENANCE LETTER (OUTPATIENT)
Age: 44
End: 2024-02-24

## 2025-04-24 ENCOUNTER — APPOINTMENT (OUTPATIENT)
Dept: CT IMAGING | Facility: CLINIC | Age: 45
End: 2025-04-24
Attending: PHYSICIAN ASSISTANT
Payer: COMMERCIAL

## 2025-04-24 ENCOUNTER — HOSPITAL ENCOUNTER (EMERGENCY)
Facility: CLINIC | Age: 45
Discharge: HOME | End: 2025-04-24
Attending: STUDENT IN AN ORGANIZED HEALTH CARE EDUCATION/TRAINING PROGRAM
Payer: COMMERCIAL

## 2025-04-24 VITALS
RESPIRATION RATE: 20 BRPM | DIASTOLIC BLOOD PRESSURE: 82 MMHG | OXYGEN SATURATION: 98 % | BODY MASS INDEX: 24.1 KG/M2 | WEIGHT: 159 LBS | HEIGHT: 68 IN | TEMPERATURE: 97.7 F | SYSTOLIC BLOOD PRESSURE: 132 MMHG | HEART RATE: 98 BPM

## 2025-04-24 DIAGNOSIS — G89.18 POSTOPERATIVE PAIN: ICD-10-CM

## 2025-04-24 LAB
ANION GAP SERPL CALCULATED.3IONS-SCNC: 8 MMOL/L (ref 7–15)
BASOPHILS # BLD AUTO: 0.1 10E3/UL (ref 0–0.2)
BASOPHILS NFR BLD AUTO: 1 %
BUN SERPL-MCNC: 9.8 MG/DL (ref 6–20)
CALCIUM SERPL-MCNC: 9.7 MG/DL (ref 8.8–10.4)
CHLORIDE SERPL-SCNC: 105 MMOL/L (ref 98–107)
CREAT SERPL-MCNC: 0.64 MG/DL (ref 0.51–0.95)
EGFRCR SERPLBLD CKD-EPI 2021: >90 ML/MIN/1.73M2
EOSINOPHIL # BLD AUTO: 0 10E3/UL (ref 0–0.7)
EOSINOPHIL NFR BLD AUTO: 0 %
ERYTHROCYTE [DISTWIDTH] IN BLOOD BY AUTOMATED COUNT: 12.3 % (ref 10–15)
GLUCOSE SERPL-MCNC: 97 MG/DL (ref 70–99)
HCO3 SERPL-SCNC: 26 MMOL/L (ref 22–29)
HCT VFR BLD AUTO: 40.3 % (ref 35–47)
HGB BLD-MCNC: 13.4 G/DL (ref 11.7–15.7)
IMM GRANULOCYTES # BLD: 0 10E3/UL
IMM GRANULOCYTES NFR BLD: 0 %
LYMPHOCYTES # BLD AUTO: 3.1 10E3/UL (ref 0.8–5.3)
LYMPHOCYTES NFR BLD AUTO: 29 %
MCH RBC QN AUTO: 30.7 PG (ref 26.5–33)
MCHC RBC AUTO-ENTMCNC: 33.3 G/DL (ref 31.5–36.5)
MCV RBC AUTO: 92 FL (ref 78–100)
MONOCYTES # BLD AUTO: 0.7 10E3/UL (ref 0–1.3)
MONOCYTES NFR BLD AUTO: 6 %
NEUTROPHILS # BLD AUTO: 7.1 10E3/UL (ref 1.6–8.3)
NEUTROPHILS NFR BLD AUTO: 65 %
NRBC # BLD AUTO: 0 10E3/UL
NRBC BLD AUTO-RTO: 0 /100
PLATELET # BLD AUTO: 199 10E3/UL (ref 150–450)
POTASSIUM SERPL-SCNC: 4 MMOL/L (ref 3.4–5.3)
RBC # BLD AUTO: 4.36 10E6/UL (ref 3.8–5.2)
SODIUM SERPL-SCNC: 139 MMOL/L (ref 135–145)
WBC # BLD AUTO: 11 10E3/UL (ref 4–11)

## 2025-04-24 PROCEDURE — 85004 AUTOMATED DIFF WBC COUNT: CPT | Performed by: PHYSICIAN ASSISTANT

## 2025-04-24 PROCEDURE — 80048 BASIC METABOLIC PNL TOTAL CA: CPT | Performed by: PHYSICIAN ASSISTANT

## 2025-04-24 PROCEDURE — 250N000011 HC RX IP 250 OP 636: Performed by: PHYSICIAN ASSISTANT

## 2025-04-24 PROCEDURE — 70487 CT MAXILLOFACIAL W/DYE: CPT

## 2025-04-24 PROCEDURE — 99284 EMERGENCY DEPT VISIT MOD MDM: CPT | Mod: FS | Performed by: STUDENT IN AN ORGANIZED HEALTH CARE EDUCATION/TRAINING PROGRAM

## 2025-04-24 PROCEDURE — 99285 EMERGENCY DEPT VISIT HI MDM: CPT | Mod: 25 | Performed by: STUDENT IN AN ORGANIZED HEALTH CARE EDUCATION/TRAINING PROGRAM

## 2025-04-24 PROCEDURE — 36415 COLL VENOUS BLD VENIPUNCTURE: CPT | Performed by: PHYSICIAN ASSISTANT

## 2025-04-24 PROCEDURE — 250N000013 HC RX MED GY IP 250 OP 250 PS 637: Performed by: PHYSICIAN ASSISTANT

## 2025-04-24 PROCEDURE — 70487 CT MAXILLOFACIAL W/DYE: CPT | Mod: 26 | Performed by: RADIOLOGY

## 2025-04-24 RX ORDER — OXYCODONE HYDROCHLORIDE 5 MG/1
5 TABLET ORAL ONCE
Status: COMPLETED | OUTPATIENT
Start: 2025-04-24 | End: 2025-04-24

## 2025-04-24 RX ORDER — IOPAMIDOL 755 MG/ML
67 INJECTION, SOLUTION INTRAVASCULAR ONCE
Status: COMPLETED | OUTPATIENT
Start: 2025-04-24 | End: 2025-04-24

## 2025-04-24 RX ORDER — OXYCODONE HYDROCHLORIDE 5 MG/1
5 TABLET ORAL EVERY 6 HOURS PRN
Qty: 12 TABLET | Refills: 0 | Status: SHIPPED | OUTPATIENT
Start: 2025-04-24 | End: 2025-04-27

## 2025-04-24 RX ADMIN — IOPAMIDOL 67 ML: 755 INJECTION, SOLUTION INTRAVENOUS at 16:45

## 2025-04-24 RX ADMIN — OXYCODONE HYDROCHLORIDE 5 MG: 5 TABLET ORAL at 15:10

## 2025-04-24 ASSESSMENT — ACTIVITIES OF DAILY LIVING (ADL)
ADLS_ACUITY_SCORE: 41

## 2025-04-24 ASSESSMENT — COLUMBIA-SUICIDE SEVERITY RATING SCALE - C-SSRS
2. HAVE YOU ACTUALLY HAD ANY THOUGHTS OF KILLING YOURSELF IN THE PAST MONTH?: NO
1. IN THE PAST MONTH, HAVE YOU WISHED YOU WERE DEAD OR WISHED YOU COULD GO TO SLEEP AND NOT WAKE UP?: NO
6. HAVE YOU EVER DONE ANYTHING, STARTED TO DO ANYTHING, OR PREPARED TO DO ANYTHING TO END YOUR LIFE?: NO

## 2025-04-24 NOTE — ED TRIAGE NOTES
Brought in by private vehicle. Was over at dental clinic and was told she would need a CT and oral surgery consult.     Mouth pain, 1 tooth extracted and was open locked twice, and has been having severe pain. They suspect nerve damage or trapped hematoma.     Triage Assessment (Adult)       Row Name 04/24/25 3657          Triage Assessment    Airway WDL WDL        Respiratory WDL    Respiratory WDL WDL        Skin Circulation/Temperature WDL    Skin Circulation/Temperature WDL WDL        Cardiac WDL    Cardiac WDL WDL        Peripheral/Neurovascular WDL    Peripheral Neurovascular WDL WDL        Cognitive/Neuro/Behavioral WDL    Cognitive/Neuro/Behavioral WDL WDL

## 2025-04-24 NOTE — ED PROVIDER NOTES
[place] EMERGENCY DEPARTMENT ([facility])    4/24/25       ED PROVIDER NOTE     History     Chief Complaint   Patient presents with    Dental Pain     HPI  Consuelo Read is a 44 year old female who presents to the ED for evaluation of dental pain.  Patient presents alone.  Patient notes that she had a right lower molar extracted earlier this week, and since that time has been dealing with pain and swelling localized to the extraction site.  She notes that pain has been limiting her eating and drinking.  Symptoms without any associated fevers, problems with swallowing or breathing.  [facility] for possible jaw dislocation.  She notes she was seen today by oral surgery through [facility] who directed her to come to the emergency room for CT scan to evaluate possible hematoma versus nerve injury.  She notes she is not concerned about pregnancy is not sexually active.  No other medical concerns.    Past Medical History  Past Medical History:   Diagnosis Date    Ectopic pregnancy 2012     Past Surgical History:   Procedure Laterality Date    ECTOPIC PREGNANCY SURGERY  2012    fallopian tube removed     amoxicillin-clavulanate (AUGMENTIN) 875-125 MG tablet  magic mouthwash suspension (diphenhydrAMINE, lidocaine, aluminum-magnesium & simethicone)  oxyCODONE (ROXICODONE) 5 MG tablet  MedroxyPROGESTERone Acetate (DEPO-PROVERA IM)  sertraline (ZOLOFT) 50 MG tablet  tretinoin (RETIN-A) 0.025 % external cream      No Known Allergies  Family History  Family History   Problem Relation Age of Onset    Asthma No family hx of     Chronic Obstructive Pulmonary Disease No family hx of      Social History   Social History     Tobacco Use    Smoking status: Former     Current packs/day: 0.50     Average packs/day: 0.5 packs/day for 25.5 years (12.7 ttl pk-yrs)     Types: Cigarettes     Start date: 11/1/1999    Smokeless tobacco: Never      A medically appropriate review of systems  "was performed with pertinent positives and negatives noted in the HPI, and all other systems negative.    Physical Exam   BP: 126/83  Pulse: 69  Temp: 98.2  F (36.8  C)  Resp: 16  Height: 172.7 cm (5' 8\")  Weight: 72.1 kg (159 lb)  SpO2: 98 %  Physical Exam      GENERAL APPEARANCE: The patient is well developed, well appearing, and in no acute distress.  HEAD:  Normocephalic.  EENT: Voice normal.  Oral mucosa moist.  Tooth in question left lower front molar with visible suture in place.  Palpation deferred due to pain.  No overlying erythema lymphangitic streaking or purulence noted.  Patient has scant left submandibular swelling.  No trismus.  No anterior cervical adenopathy or neck swelling.  She is tolerating her secretions with normal voice.  NECK: Trachea is midline.  Uvula midline without exudates  HEART: Regular rate and normal rhythm.   EXTREMITIES: No cyanosis, clubbing, or edema.  NEUROLOGIC: No focal sensory or motor deficits are noted.  PSYCHIATRIC: The patient is awake, alert.  Appropriate mood and affect.  SKIN: Warm, dry, and well perfused.      ED Course, Procedures, & Data     ED Course as of 04/24/25 2115   Thu Apr 24, 2025   1507 Spoke with the oral surgery team here at [facility].  They are not aware of patient coming.  In regards to imaging they recommend CT with contrast, ordered.   1545 Received call from her oral surgery.  They did speak with patient's oral surgeon through [facility] and note that this should be managed by dentistry versus oral surgery.  They do feel that CT with contrast is indicated.   [id number] Dentistry paged          Results for orders placed or performed during the hospital encounter of 04/24/25   CT Facial Bones with Contrast     Status: None    Narrative    CT FACIAL BONES WITH CONTRAST 4/24/2025 5:03 PM    History:  Recent left lower front molar extraction subsequent pain  mild swelling oral surgeon concerned about hematoma versus nerve  injury    Contrast: " iopamidol (ISOVUE-370) solution 67mL    Comparison:  None      Technique: After administration of intravenous contrast, using thin  collimation multidetector helical acquisition technique, axial and  coronal thin section CT images were reconstructed through the facial  bones. Images were reviewed in bone and soft tissue windows.    Findings:    Postprocedural changes of left mandibular molar extraction. The. No  evidence of retained tooth. Minimal inflammatory changes of the left  cheek. No drainable abscess. There is no evident fracture or osseous  erosions of the facial bones. The cribriform plate appears intact.  Alignment of the facial bones appears normal.     There is no hematoma, soft tissue mass or gas visualized within the  orbits. The visualized portions of the paranasal sinuses are clear.  Debris within the right external auditory canal.      Impression    Impression:   Postprocedural changes of left mandibular molar extraction with  minimal inflammatory changes of the left cheek. No evidence retained  tooth, or evidence for abscess.     I have personally reviewed the examination and initial interpretation  and I agree with the findings.    KEYONNA GILBERT MD         SYSTEM ID:  [id number]   Basic metabolic panel     Status: Normal   Result Value Ref Range    Sodium 139 135 - 145 mmol/L    Potassium 4.0 3.4 - 5.3 mmol/L    Chloride 105 98 - 107 mmol/L    Carbon Dioxide (CO2) 26 22 - 29 mmol/L    Anion Gap 8 7 - 15 mmol/L    Urea Nitrogen 9.8 6.0 - 20.0 mg/dL    Creatinine 0.64 0.51 - 0.95 mg/dL    GFR Estimate >90 >60 mL/min/1.73m2    Calcium 9.7 8.8 - 10.4 mg/dL    Glucose 97 70 - 99 mg/dL   CBC with platelets and differential     Status: None   Result Value Ref Range    WBC Count 11.0 4.0 - 11.0 10e3/uL    RBC Count 4.36 3.80 - 5.20 10e6/uL    Hemoglobin 13.4 11.7 - 15.7 g/dL    Hematocrit 40.3 35.0 - 47.0 %    MCV 92 78 - 100 fL    MCH 30.7 26.5 - 33.0 pg    MCHC 33.3 31.5 - 36.5 g/dL    RDW 12.3 10.0 -  15.0 %    Platelet Count 199 150 - 450 10e3/uL    % Neutrophils 65 %    % Lymphocytes 29 %    % Monocytes 6 %    % Eosinophils 0 %    % Basophils 1 %    % Immature Granulocytes 0 %    NRBCs per 100 WBC 0 <1 /100    Absolute Neutrophils 7.1 1.6 - 8.3 10e3/uL    Absolute Lymphocytes 3.1 0.8 - 5.3 10e3/uL    Absolute Monocytes 0.7 0.0 - 1.3 10e3/uL    Absolute Eosinophils 0.0 0.0 - 0.7 10e3/uL    Absolute Basophils 0.1 0.0 - 0.2 10e3/uL    Absolute Immature Granulocytes 0.0 <=0.4 10e3/uL    Absolute NRBCs 0.0 10e3/uL   CBC with platelets differential     Status: None    Narrative    The following orders were created for panel order CBC with platelets differential.  Procedure                               Abnormality         Status                     ---------                               -----------         ------                     CBC with platelets and ...[[id number]]                      Final result                 Please view results for these tests on the individual orders.     Medications   oxyCODONE (ROXICODONE) tablet 5 mg (5 mg Oral $Given 4/24/25 1510)   sodium chloride (PF) 0.9% PF flush 70 mL (70 mLs Intravenous $Given 4/24/25 1645)   iopamidol (ISOVUE-370) solution 67 mL (67 mLs Intravenous $Given 4/24/25 1645)     Labs Ordered and Resulted from Time of ED Arrival to Time of ED Departure   BASIC METABOLIC PANEL - Normal       Result Value    Sodium 139      Potassium 4.0      Chloride 105      Carbon Dioxide (CO2) 26      Anion Gap 8      Urea Nitrogen 9.8      Creatinine 0.64      GFR Estimate >90      Calcium 9.7      Glucose 97     CBC WITH PLATELETS AND DIFFERENTIAL    WBC Count 11.0      RBC Count 4.36      Hemoglobin 13.4      Hematocrit 40.3      MCV 92      MCH 30.7      MCHC 33.3      RDW 12.3      Platelet Count 199      % Neutrophils 65      % Lymphocytes 29      % Monocytes 6      % Eosinophils 0      % Basophils 1      % Immature Granulocytes 0      NRBCs per 100 WBC 0      Absolute  Neutrophils 7.1      Absolute Lymphocytes 3.1      Absolute Monocytes 0.7      Absolute Eosinophils 0.0      Absolute Basophils 0.1      Absolute Immature Granulocytes 0.0      Absolute NRBCs 0.0       CT Facial Bones with Contrast   Final Result   Impression:    Postprocedural changes of left mandibular molar extraction with   minimal inflammatory changes of the left cheek. No evidence retained   tooth, or evidence for abscess.       I have personally reviewed the examination and initial interpretation   and I agree with the findings.      KEYONNA GILBERT MD            SYSTEM ID:  [id number]             Critical care was not performed.     Medical Decision Making  The patient's presentation was of moderate complexity (an acute complicated injury).    The patient's evaluation involved:  ordering and/or review of 3+ test(s) in this encounter (see separate area of note for details)  discussion of management or test interpretation with another health professional (dentistry, oral surgery)    The patient's management necessitated moderate risk (prescription drug management including medications given in the ED) and moderate risk (IV contrast administration).    Assessment & Plan    This is a 44-year-old female presenting with concerns for postop dental pain and swelling after recent left lower molar extraction.  She was directed to come here for CT imaging and possible oral surgery consult.  On presentation to the department she has reassuring vital signs.  On exam she is protecting her airway.  She has no trismus.  She has some mild left submandibular swelling without findings suggest obvious infectious pathology.  I did discuss the case with the on-call oral surgeon who was not aware of patient presenting here to the department.  They did recommend a CT scan with contrast, plan for discussing imaging findings with them once resulted.  CBC and chemistry initiated patient given oral oxycodone IV was established.    Labs  here reviewed and reassuring.  CBC without leukocytosis or anemia.  Chemistry within reference range.  CT results per radiologist read show left mandibular molar extraction with postprocedural changes minimal inflammatory changes.  These results were discussed with dentistry, who reviewed patient's images.  They do not feel patient needs intervention, or consultation tonight here in the ED.  Discussed with patient recommendations close follow-up with dentistry.  I will expand her antibiotic coverage from amoxicillin to Augmentin.  She was given prescription for Augmentin, Magic mouthwash, and short supply of oxycodone for breakthrough pain.  Discussed with patient strict return precautions.  Patient has no other questions or concerns at this time.  Red flag signs were addressed, and they were in agreement with the patient care plan provided.    Patient discussed with attending physician , who agrees with my plan of care.    I have reviewed the nursing notes. I have reviewed the findings, diagnosis, plan and need for follow up with the patient.    Discharge Medication List as of 4/24/2025  8:26 PM        START taking these medications    Details   amoxicillin-clavulanate (AUGMENTIN) 875-125 MG tablet Take 1 tablet by mouth 2 times daily for 7 days., Disp-14 tablet, R-0, Local Print      oxyCODONE (ROXICODONE) 5 MG tablet Take 1 tablet (5 mg) by mouth every 6 hours as needed for pain., Disp-12 tablet, R-0, Local Print      magic mouthwash suspension (diphenhydrAMINE, lidocaine, aluminum-magnesium & simethicone) Swish and swallow 10 mLs in mouth every 6 hours as needed for mouth sores., Disp-240 mL, R-0, Local Print             Final diagnoses:   Postoperative pain       Chel Patricio [facility] EMERGENCY DEPARTMENT  4/24/2025     Chel Patricio PA-C  04/24/25 2111    --    ED Attending Physician Attestation    I Sultana Crowell MD, cared for this patient with the Advanced Practice Provider  (ECTOR). I personally provided a substantive portion of the care for this patient, including approving the care plan for the number and complexity of problems addressed and taking responsibility related to the risk of complications and/or morbidity or mortality of patient management. Please see the ECTOR's documentation for full details.    Sultana Crowell MD  Emergency Medicine          Sultana Crowell MD  04/24/25 7799

## 2025-04-25 NOTE — DISCHARGE INSTRUCTIONS
Here in the emergency room a reassuring evaluation.  We obtained a CTA of your teeth which do not show any findings for infection or significant life-threatening postop complication.  I spoke with dentistry and oral surgery.  At this time no additional intervention at this time as needed.  You are given prescriptions for oxycodone pain medication, Magic mouthwash, as well as antibiotic Augmentin.  Recommend discontinuing the amoxicillin and starting the Augmentin as prescribed.  I do recommend reaching out to your dentistry team as they may want to see you for follow-up.  I expect your symptoms to improve within the next 1 week.  Return back to the emergency room if you have any new or worsening symptoms such as problems with breathing swallowing facial swelling severe pain fevers drainage in your mouth any other new or worsening symptoms.

## 2025-04-26 ENCOUNTER — HEALTH MAINTENANCE LETTER (OUTPATIENT)
Age: 45
End: 2025-04-26

## 2025-07-21 NOTE — TELEPHONE ENCOUNTER
--DO NOT REPLY - Sent from PACT - If sent to wrong pool, reroute to P ECO Reroute pool --    Message Type:  Refill Medication   Is the medication pended:Yes  Medication name: HYDROcodone-acetaminophen (NORCO) 5-325 MG per tablet   Message:   Preferred pharmacy verified, and selected.  [id number] - [place], [place] - [address]  Call Back #: [phone]  Can a detailed message be left?  Yes - Voicemail   Is the patient OUT of Medication?  Yes: Working Hours: route as HIGH priority according to KB. Patient has been advised the message will be reviewed within 1 business dayCopied from CRM #[id number]. Topic: MW Medication/Rx - MW Rx Refill  >> Jul 21, 2025  8:21 AM Nemo BYRNE wrote:  duane called to request a medication refill and has meds for the next 2-3 business days    New Request/No Encounter found   >ADDED NOTE / CREATED CUSTOM CLINICAL CALL  >Reason for call 'Refill Request'  >Sent for Med Refill support.   Central Prior Authorization Team   Phone: [phone]